# Patient Record
Sex: FEMALE | Race: ASIAN | NOT HISPANIC OR LATINO | Employment: FULL TIME | URBAN - METROPOLITAN AREA
[De-identification: names, ages, dates, MRNs, and addresses within clinical notes are randomized per-mention and may not be internally consistent; named-entity substitution may affect disease eponyms.]

---

## 2017-01-19 ENCOUNTER — APPOINTMENT (OUTPATIENT)
Dept: LAB | Facility: CLINIC | Age: 32
End: 2017-01-19

## 2017-01-19 ENCOUNTER — TRANSCRIBE ORDERS (OUTPATIENT)
Dept: LAB | Facility: CLINIC | Age: 32
End: 2017-01-19

## 2017-01-19 DIAGNOSIS — Z11.1 SCREENING EXAMINATION FOR PULMONARY TUBERCULOSIS: Primary | ICD-10-CM

## 2017-01-19 DIAGNOSIS — Z11.1 SCREENING EXAMINATION FOR PULMONARY TUBERCULOSIS: ICD-10-CM

## 2017-01-19 PROCEDURE — 86480 TB TEST CELL IMMUN MEASURE: CPT

## 2017-01-19 PROCEDURE — 36415 COLL VENOUS BLD VENIPUNCTURE: CPT

## 2017-01-21 LAB
ANNOTATION COMMENT IMP: NORMAL
GAMMA INTERFERON BACKGROUND BLD IA-ACNC: 0.08 IU/ML
M TB IFN-G BLD-IMP: NEGATIVE
M TB IFN-G CD4+ BCKGRND COR BLD-ACNC: 0.04 IU/ML
M TB IFN-G CD4+ T-CELLS BLD-ACNC: 0.12 IU/ML
MITOGEN IGNF BLD-ACNC: >10 IU/ML
QUANTIFERON-TB GOLD IN TUBE: NORMAL
SERVICE CMNT-IMP: NORMAL

## 2017-04-09 ENCOUNTER — OFFICE VISIT (OUTPATIENT)
Dept: URGENT CARE | Facility: MEDICAL CENTER | Age: 32
End: 2017-04-09
Payer: COMMERCIAL

## 2017-04-09 PROCEDURE — S9088 SERVICES PROVIDED IN URGENT: HCPCS

## 2017-04-09 PROCEDURE — 99213 OFFICE O/P EST LOW 20 MIN: CPT

## 2017-07-12 ENCOUNTER — OFFICE VISIT (OUTPATIENT)
Dept: URGENT CARE | Facility: MEDICAL CENTER | Age: 32
End: 2017-07-12
Payer: COMMERCIAL

## 2017-07-12 PROCEDURE — 99203 OFFICE O/P NEW LOW 30 MIN: CPT

## 2017-07-12 PROCEDURE — S9088 SERVICES PROVIDED IN URGENT: HCPCS

## 2017-07-20 ENCOUNTER — ALLSCRIPTS OFFICE VISIT (OUTPATIENT)
Dept: OTHER | Facility: OTHER | Age: 32
End: 2017-07-20

## 2017-07-20 DIAGNOSIS — N97.9 FEMALE INFERTILITY: ICD-10-CM

## 2017-07-20 DIAGNOSIS — N94.6 DYSMENORRHEA: ICD-10-CM

## 2017-07-20 DIAGNOSIS — R19.7 DIARRHEA: ICD-10-CM

## 2017-07-20 DIAGNOSIS — N80.9 ENDOMETRIOSIS: ICD-10-CM

## 2017-07-24 ENCOUNTER — TRANSCRIBE ORDERS (OUTPATIENT)
Dept: LAB | Facility: CLINIC | Age: 32
End: 2017-07-24

## 2017-07-24 ENCOUNTER — APPOINTMENT (OUTPATIENT)
Dept: LAB | Facility: CLINIC | Age: 32
End: 2017-07-24
Payer: COMMERCIAL

## 2017-07-24 ENCOUNTER — HOSPITAL ENCOUNTER (OUTPATIENT)
Dept: ULTRASOUND IMAGING | Facility: HOSPITAL | Age: 32
Discharge: HOME | End: 2017-07-24
Attending: OBSTETRICS & GYNECOLOGY
Payer: COMMERCIAL

## 2017-07-24 DIAGNOSIS — Z00.8 HEALTH EXAMINATION IN POPULATION SURVEY: Primary | ICD-10-CM

## 2017-07-24 DIAGNOSIS — N97.9 FEMALE INFERTILITY: ICD-10-CM

## 2017-07-24 DIAGNOSIS — N94.6 DYSMENORRHEA: ICD-10-CM

## 2017-07-24 DIAGNOSIS — Z00.8 HEALTH EXAMINATION IN POPULATION SURVEY: ICD-10-CM

## 2017-07-24 LAB
CHOLEST SERPL-MCNC: 154 MG/DL (ref 50–200)
EST. AVERAGE GLUCOSE BLD GHB EST-MCNC: 123 MG/DL
ESTRADIOL SERPL-MCNC: 50 PG/ML
FSH SERPL-ACNC: 5.3 MIU/ML
HBA1C MFR BLD: 5.9 % (ref 4.2–6.3)
HDLC SERPL-MCNC: 61 MG/DL (ref 40–60)
INSULIN SERPL-ACNC: 10.2 MU/L (ref 3–25)
LDLC SERPL CALC-MCNC: 83 MG/DL (ref 0–100)
LH SERPL-ACNC: 1.8 MIU/ML
TRIGL SERPL-MCNC: 51 MG/DL
TSH SERPL DL<=0.05 MIU/L-ACNC: 1.02 UIU/ML (ref 0.36–3.74)

## 2017-07-24 PROCEDURE — 76856 US EXAM PELVIC COMPLETE: CPT

## 2017-07-24 PROCEDURE — 83036 HEMOGLOBIN GLYCOSYLATED A1C: CPT

## 2017-07-24 PROCEDURE — 82670 ASSAY OF TOTAL ESTRADIOL: CPT

## 2017-07-24 PROCEDURE — 83525 ASSAY OF INSULIN: CPT

## 2017-07-24 PROCEDURE — 84403 ASSAY OF TOTAL TESTOSTERONE: CPT

## 2017-07-24 PROCEDURE — 84402 ASSAY OF FREE TESTOSTERONE: CPT

## 2017-07-24 PROCEDURE — 83001 ASSAY OF GONADOTROPIN (FSH): CPT

## 2017-07-24 PROCEDURE — 76830 TRANSVAGINAL US NON-OB: CPT

## 2017-07-24 PROCEDURE — 84443 ASSAY THYROID STIM HORMONE: CPT

## 2017-07-24 PROCEDURE — 83002 ASSAY OF GONADOTROPIN (LH): CPT

## 2017-07-24 PROCEDURE — 80061 LIPID PANEL: CPT

## 2017-07-24 PROCEDURE — 36415 COLL VENOUS BLD VENIPUNCTURE: CPT

## 2017-07-25 ENCOUNTER — ALLSCRIPTS OFFICE VISIT (OUTPATIENT)
Dept: OTHER | Facility: OTHER | Age: 32
End: 2017-07-25

## 2017-07-25 LAB
TESTOST FREE SERPL-MCNC: 6.1 PG/ML (ref 0–4.2)
TESTOST SERPL-MCNC: 68 NG/DL (ref 8–48)

## 2017-07-26 ENCOUNTER — GENERIC CONVERSION - ENCOUNTER (OUTPATIENT)
Dept: OTHER | Facility: OTHER | Age: 32
End: 2017-07-26

## 2017-08-16 ENCOUNTER — APPOINTMENT (OUTPATIENT)
Dept: LAB | Facility: CLINIC | Age: 32
End: 2017-08-16
Payer: COMMERCIAL

## 2017-08-16 ENCOUNTER — ALLSCRIPTS OFFICE VISIT (OUTPATIENT)
Dept: OTHER | Facility: OTHER | Age: 32
End: 2017-08-16

## 2017-08-16 DIAGNOSIS — N97.9 FEMALE INFERTILITY: ICD-10-CM

## 2017-08-16 DIAGNOSIS — N80.9 ENDOMETRIOSIS: ICD-10-CM

## 2017-08-16 DIAGNOSIS — N94.6 DYSMENORRHEA: ICD-10-CM

## 2017-08-16 LAB — PROGEST SERPL-MCNC: 1.2 NG/ML

## 2017-08-16 PROCEDURE — 84144 ASSAY OF PROGESTERONE: CPT

## 2017-08-16 PROCEDURE — 36415 COLL VENOUS BLD VENIPUNCTURE: CPT

## 2017-08-22 ENCOUNTER — GENERIC CONVERSION - ENCOUNTER (OUTPATIENT)
Dept: OTHER | Facility: OTHER | Age: 32
End: 2017-08-22

## 2017-08-23 ENCOUNTER — GENERIC CONVERSION - ENCOUNTER (OUTPATIENT)
Dept: OTHER | Facility: OTHER | Age: 32
End: 2017-08-23

## 2017-10-03 ENCOUNTER — ALLSCRIPTS OFFICE VISIT (OUTPATIENT)
Dept: OTHER | Facility: OTHER | Age: 32
End: 2017-10-03

## 2017-10-03 DIAGNOSIS — N80.9 ENDOMETRIOSIS: ICD-10-CM

## 2017-10-03 DIAGNOSIS — Z12.31 ENCOUNTER FOR SCREENING MAMMOGRAM FOR MALIGNANT NEOPLASM OF BREAST: ICD-10-CM

## 2017-10-03 DIAGNOSIS — R10.2 PELVIC AND PERINEAL PAIN: ICD-10-CM

## 2017-10-03 DIAGNOSIS — N97.9 FEMALE INFERTILITY: ICD-10-CM

## 2017-10-03 PROCEDURE — 87624 HPV HI-RISK TYP POOLED RSLT: CPT | Performed by: OBSTETRICS & GYNECOLOGY

## 2017-10-03 PROCEDURE — G0145 SCR C/V CYTO,THINLAYER,RESCR: HCPCS | Performed by: OBSTETRICS & GYNECOLOGY

## 2017-10-04 ENCOUNTER — LAB REQUISITION (OUTPATIENT)
Dept: LAB | Facility: HOSPITAL | Age: 32
End: 2017-10-04
Payer: COMMERCIAL

## 2017-10-04 DIAGNOSIS — Z01.419 ENCOUNTER FOR GYNECOLOGICAL EXAMINATION WITHOUT ABNORMAL FINDING: ICD-10-CM

## 2017-10-06 LAB — HPV RRNA GENITAL QL NAA+PROBE: NORMAL

## 2017-10-18 LAB
LAB AP GYN PRIMARY INTERPRETATION: NORMAL
Lab: NORMAL

## 2017-10-20 ENCOUNTER — GENERIC CONVERSION - ENCOUNTER (OUTPATIENT)
Dept: OTHER | Facility: OTHER | Age: 32
End: 2017-10-20

## 2017-10-24 ENCOUNTER — APPOINTMENT (OUTPATIENT)
Dept: LAB | Facility: CLINIC | Age: 32
End: 2017-10-24
Payer: COMMERCIAL

## 2017-10-24 ENCOUNTER — TRANSCRIBE ORDERS (OUTPATIENT)
Dept: LAB | Facility: CLINIC | Age: 32
End: 2017-10-24

## 2017-10-24 DIAGNOSIS — N97.9 FEMALE INFERTILITY: ICD-10-CM

## 2017-10-24 LAB — PROGEST SERPL-MCNC: 2.8 NG/ML

## 2017-10-24 PROCEDURE — 84144 ASSAY OF PROGESTERONE: CPT

## 2017-10-24 PROCEDURE — 36415 COLL VENOUS BLD VENIPUNCTURE: CPT

## 2017-10-25 ENCOUNTER — GENERIC CONVERSION - ENCOUNTER (OUTPATIENT)
Dept: OTHER | Facility: OTHER | Age: 32
End: 2017-10-25

## 2017-11-30 ENCOUNTER — ALLSCRIPTS OFFICE VISIT (OUTPATIENT)
Dept: OTHER | Facility: OTHER | Age: 32
End: 2017-11-30

## 2017-12-05 NOTE — PROGRESS NOTES
Assessment    1  Adjustment disorder with anxiety (309 24) (F43 22)   2  Need for immunization against influenza (V04 81) (Z23)    Discussion/Summary    Forms completed  Flu vaccine today  Possible side effects of new medications were reviewed with the patient/guardian today  The treatment plan was reviewed with the patient/guardian  The patient/guardian understands and agrees with the treatment plan      Chief Complaint  Patient is here to have forms filled out  History of Present Illness  33year old F here for having a form filled out for taking her NCLEX exam  She has anxiety and adjustment disorder and has extreme anxiety when taking exams  She states she gets flushed, nervous and can't concentrate  She is requesting a private room and an extra hour of testing time  She would like a flu vaccine  Review of Systems    Constitutional: No fever, no chills, feels well, no tiredness, no recent weight gain or weight loss  Psychiatric: as noted in HPI  Active Problems    1  Adjustment disorder with anxiety (309 24) (F43 22)   2  Candidiasis, cutaneous (112 3) (B37 2)   3  Chronic pelvic pain in female (629 2,037 70) (R10 2,G89 29)   4  Dysmenorrhea (625 3) (N94 6)   5  Encounter for screening mammogram for high-risk patient (V76 11) (Z12 31)   6  Endometriosis (617 9) (N80 9)   7  Female infertility (628 9) (N97 9)   8  Morbid obesity with BMI of 40 0-44 9, adult (278 01,V85 41) (E66 01,Z68 41)   9  Well female exam with routine gynecological exam (V72 31) (Z01 419)    Past Medical History    1  History of Closed Fracture Of The Left Clavicle (810 00)   2  History of pilonidal cyst (V13 3) (Z87 2)    The active problems and past medical history were reviewed and updated today  Surgical History    1  History of Laparoscopy (Diagnostic)   2  History of Pilonidal Cyst Resection    Family History  Mother    1  Family history of malignant neoplasm of breast (V16 3) (Z80 3)   2   Family history of malignant neoplasm of stomach (V16 0) (Z80 0)   3  Family history of Ovarian cancer  Father    4  Family history of migraine headaches (V17 2) (Z82 0)    The family history was reviewed and updated today  Social History    · Does not exercise (V69 0) (Z72 3)   · Denied: History of Drug use   · Never a smoker   · Social alcohol use (Z78 9)  The social history was reviewed and updated today  Current Meds   1  No Reported Medications Recorded    The medication list was reviewed and updated today  Allergies    1  Sulfa Drugs   2  Sulfamethoxazole POWD    3  No Known Environmental Allergies   4  No Known Food Allergies    Vitals  Vital Signs    Recorded: 11LEP6171 01:50PM   Temperature 98 5 F   Heart Rate 903   Systolic 050   Diastolic 82   Height 5 ft 2 in   Weight 220 lb    BMI Calculated 40 24   BSA Calculated 1 99   O2 Saturation 99     Physical Exam    Constitutional   General appearance: Abnormal   obese     Psychiatric   Orientation to person, place, and time: Normal     Mood and affect: Normal          Future Appointments    Date/Time Provider Specialty Site   49/17/1527 49:23 AM Brandyn Montemayor DO Obstetrics/Gynecology [address]     Signatures   Electronically signed by : Sara Jean MD; Nov 30 2017  2:14PM EST                       (Author)

## 2017-12-06 ENCOUNTER — ALLSCRIPTS OFFICE VISIT (OUTPATIENT)
Dept: OTHER | Facility: OTHER | Age: 32
End: 2017-12-06

## 2017-12-06 DIAGNOSIS — N97.9 FEMALE INFERTILITY: ICD-10-CM

## 2017-12-06 DIAGNOSIS — N80.9 ENDOMETRIOSIS: ICD-10-CM

## 2017-12-07 NOTE — PROGRESS NOTES
Assessment  1  Endometriosis (617 9) (N80 9)   2  Female infertility (628 9) (N97 9)   3  Anovulatory cycle (628 0) (N97 0)    Plan  Anovulatory cycle, Endometriosis    · (1) ESTRADIOL; Status:Active; Requested for:29Mkz5106;    Perform:[facility]; OXX:50IIP1430; Ordered;cycle, Endometriosis; Ordered By:Apryl Montemayor;   · * US PELVIS COMPLETE (TRANSABDOMINAL AND TRANSVAGINAL); Status:Hold For -Scheduling; Requested for:34Ear4967;    Perform:[address] [facility]; CLARISSE:10FDW1676; Ordered; For:Anovulatory cycle, Endometriosis; Ordered By:Apryl Montemayor;   · FL HYSTEROSALPINGOGRAM; Status:Active; Requested for:62Yuz2076;    Perform:[address] [facility]; BZR:26EOK6063; Last Updated Rose Earl; 12/6/2017 10:54:30 AM;Ordered; For:Anovulatory cycle, Endometriosis; Ordered By:Apryl Montemayor; Anovulatory cycle, Endometriosis, Female infertility    · (1) ANTI-MULLERIAN HORMONE; Status:Active; Requested for:96Fak3075;    Perform:[facility]; NQC:83OUM0872; Ordered; For:Anovulatory cycle, Endometriosis, Female infertility; Ordered By:Apryl Montemayor; Endometriosis, Female infertility    · Letrozole 2 5 MG Oral Tablet; take 2 tablet daily on cycle days 3-7   Rx By: Angele Heimlich; Dispense: 5 Days ; #:10 Tablet; Refill: 0;Endometriosis, Female infertility; SUPRIYA = N; Verified Transmission to [address] [place] [address] [address]; Last Updated By: System, [employer]; 12/6/2017 10:02:41 AM    Discussion/Summary  Discussion Summary: On cycle day 1, call to schedule day 3 ultrasound with Radiology and call office to set up hysterosalpingogram on cycles day 4-9 labs done on cycle day 3 if possible  letrozole on cycle day 3 and continue through day 7 will check for ovulation on cycle day 21 or 7 days after positive ovulation predictor kit by having a progesterone level drawn  semen analysis -test kit given     Counseling Documentation With Imm: The patient was counseled regarding diagnostic results,-- instructions for management,-- prognosis,-- patient and family education,-- risks and benefits of treatment options  total time of encounter was 30 minutes-- and-- 20 minutes was spent counseling  Chief Complaint  Chief Complaint Free Text Note Form: pt here for f/u on trying to conceive  states ovulation kits arent working, states blood work shows she isnt ovulating  would like to discuss other options  History of Present Illness  HPI: 33year old [de-identified] who presents for follow-up and information regarding ovulation induction with medication  Patient has been doing ovulation predictor kits at home and has not reached a peak  Her day 21 progesterone since have indicated that her cycles are anovulatory  Patient is very interested in pursuing pregnancy at this point  We discussed the risks and benefits of letrozole ovulation induction  I recommend HSG to confirm tubal patency secondary to history of endometriosis  Semen analysis should be performed and information for testing given to patient  We will check a day 3 ultrasound to look for ovarian cysts as well as an anti mullerian hormone level and an estradiol level to evaluate ovarian reserve and possible response to ovulation induction  All testing was reviewed with the patient in detail, all scripts given the and instruction sheet was reviewed and given to the patient  regards to the letrozole, we discussed the risk of birth defects, ovarian hyperstimulation, off-label use of the medication, possible side effects of hot flashes nausea headaches bloating muscle aches blurred vision and fatigue  We discussed that if in ovarian cyst is identified, we would not be able to proceed with letrozole cycle  ovulation does not occur after treatment with letrozole, I would refer to Reproductive Endocrine at that point  Patient verbalizes understanding  All questions answered        Review of Systems  Focused-Female:  Constitutional: not feeling poorly-- and-- not feeling tired  Gastrointestinal: no nausea-- and-- no vomiting  Genitourinary: pelvic pain-- and-- dysmenorrhea, but-- no vaginal discharge-- and-- no unexplained vaginal bleeding  ROS Reviewed:   ROS reviewed  Active Problems  1  Adjustment disorder with anxiety (309 24) (F43 22)   2  Endometriosis (617 9) (N80 9)   3  Female infertility (628 9) (N97 9)   4  Morbid obesity with BMI of 40 0-44 9, adult (278 01,V85 41) (Z79 96,T21 70)    Past Medical History  1  History of Closed Fracture Of The Left Clavicle (810 00)   2  History of pilonidal cyst (V13 3) (Z87 2)  Active Problems And Past Medical History Reviewed: The active problems and past medical history were reviewed and updated today  Surgical History    1  History of Laparoscopy (Diagnostic)   2  History of Pilonidal Cyst Resection  Surgical History Reviewed: The surgical history was reviewed and updated today  Family History  Mother    1  Family history of malignant neoplasm of breast (V16 3) (Z80 3)   2  Family history of malignant neoplasm of stomach (V16 0) (Z80 0)   3  Family history of Ovarian cancer  Father    4  Family history of migraine headaches (V17 2) (Z82 0)  Family History Reviewed: The family history was reviewed and updated today  Social History   · Does not exercise (V69 0) (Z72 3)   · Denied: History of Drug use   · Never a smoker   · Social alcohol use (Z78 9)  Social History Reviewed: The social history was reviewed and updated today  Current Meds   1  No Reported Medications Recorded  Medication List Reviewed: The medication list was reviewed and updated today  Allergies  1  Sulfa Drugs   2  Sulfamethoxazole POWD  3  No Known Environmental Allergies   4   No Known Food Allergies    Vitals  Vital Signs    Recorded: 49PHB3237 09:30AM   Heart Rate 96   Systolic 299   Diastolic 88   Height 5 ft 2 in   Weight 217 lb    BMI Calculated 39 69   BSA Calculated 1 98   Pain Scale 0 Physical Exam   Constitutional  General appearance: No acute distress, well appearing and well nourished     Psychiatric  Orientation to person, place, and time: Normal    Mood and affect: Normal        Signatures   Electronically signed by : Ashley Boland DO; Dec  6 5072 11:27AM EST                       (Author)

## 2017-12-28 ENCOUNTER — TRANSCRIBE ORDERS (OUTPATIENT)
Dept: MRI IMAGING | Facility: CLINIC | Age: 32
End: 2017-12-28

## 2017-12-29 ENCOUNTER — HOSPITAL ENCOUNTER (OUTPATIENT)
Dept: ULTRASOUND IMAGING | Facility: CLINIC | Age: 32
Discharge: HOME | End: 2017-12-29
Attending: OBSTETRICS & GYNECOLOGY
Payer: COMMERCIAL

## 2017-12-29 ENCOUNTER — APPOINTMENT (OUTPATIENT)
Dept: LAB | Facility: CLINIC | Age: 32
End: 2017-12-29
Payer: COMMERCIAL

## 2017-12-29 DIAGNOSIS — N80.9 ENDOMETRIOSIS: ICD-10-CM

## 2017-12-29 DIAGNOSIS — N97.9 FEMALE INFERTILITY: ICD-10-CM

## 2017-12-29 LAB — ESTRADIOL SERPL-MCNC: 43 PG/ML

## 2017-12-29 PROCEDURE — 82670 ASSAY OF TOTAL ESTRADIOL: CPT

## 2017-12-29 PROCEDURE — 76830 TRANSVAGINAL US NON-OB: CPT

## 2017-12-29 PROCEDURE — 83516 IMMUNOASSAY NONANTIBODY: CPT

## 2017-12-29 PROCEDURE — 76856 US EXAM PELVIC COMPLETE: CPT

## 2017-12-29 PROCEDURE — 36415 COLL VENOUS BLD VENIPUNCTURE: CPT

## 2018-01-02 ENCOUNTER — HOSPITAL ENCOUNTER (OUTPATIENT)
Dept: RADIOLOGY | Facility: HOSPITAL | Age: 33
Discharge: HOME | End: 2018-01-02
Attending: OBSTETRICS & GYNECOLOGY
Payer: COMMERCIAL

## 2018-01-02 DIAGNOSIS — N97.9 FEMALE INFERTILITY: ICD-10-CM

## 2018-01-02 DIAGNOSIS — N80.9 ENDOMETRIOSIS: ICD-10-CM

## 2018-01-02 PROCEDURE — 58340 CATHETER FOR HYSTEROGRAPHY: CPT

## 2018-01-02 PROCEDURE — 74740 X-RAY FEMALE GENITAL TRACT: CPT

## 2018-01-02 RX ADMIN — IOHEXOL 3 ML: 240 INJECTION, SOLUTION INTRATHECAL; INTRAVASCULAR; INTRAVENOUS; ORAL at 12:52

## 2018-01-03 ENCOUNTER — GENERIC CONVERSION - ENCOUNTER (OUTPATIENT)
Dept: OTHER | Facility: OTHER | Age: 33
End: 2018-01-03

## 2018-01-03 LAB — MIS SERPL-MCNC: 1.4 NG/ML

## 2018-01-11 NOTE — MISCELLANEOUS
Message   Recorded as Task   Date: 07/26/2017 03:13 PM, Created By: Hamlet Ramey   Task Name: Call Back   Assigned To: Yoseph Hayes   Regarding Patient: Jacque Or, Status: Active   Comment:    Diamond Mccarthy - 26 Jul 2017 3:13 PM     TASK CREATED  Caller: Self; Results Inquiry; [phone] (Home); [phone] (Work)  patient returned you call in regards to bloodwork and us results   Yoseph Hayes - 26 Jul 2017 4:30 PM     TASK EDITED  spoke with patient  see labs/US  levels look good, elevated T    small intramural fibroid  does not appear to affect lining  OPK test  call with positive for progesterone level 7 days later          Signatures   Electronically signed by : Emre Hickman DO; Jul 26 1978  4:30PM EST                       (Author)

## 2018-01-11 NOTE — RESULT NOTES
Verified Results  (1) PROGESTERONE [id number] 90:94XB Poala Delgadillo Order Number: [id number]     Test Name Result Flag Reference   PROGESTERONE 1 20 ng/mL     Patients taking DHEA-S may have falsely elevated Progesterone levels  Recommend ordering Progesterone, Lab Ena 585405 to be done by Rickie  PROGESTERONE:     Serum progesterone 5-25 ng/mL should not be the sole determinant in excluding pregnancy  Menstruating Females:    Follicular phase 4 114-8 49 ng/mL   Luteal phase 2 25-24 2 ng/mL   Mid-luteal phase 8 76-21 6 ng/mL   Postmenopausal Females <0 200-0 901 ng/mL     Pregnant Females:   First trimester of pregnancy 11 4-41 0 ng/mL   Second trimester of pregnancy 13 8-156 ng/mL   Third trimester of pregnancy 51 4->200 ng/mL

## 2018-01-11 NOTE — MISCELLANEOUS
Message   Recorded as Task   Date: 08/23/2017 12:42 PM, Created By: Zacarias Her   Task Name: Medical Complaint Callback   Assigned To: Eileen Holm   Regarding Patient: Marie Meyer, Status: Active   CommentPerlaalma Portillo - 23 Aug 2017 12:42 PM     TASK CREATED  Caller: Self; Medical Complaint; [phone] (Home); [phone] (Work)  Got your message about her blood results but wnats to know if she can try it again, next cycle or the one after that  She feels her hormones were "all messed up" due to a lot of stress she was having in her life  Please call to discuss options and if she can repeat the process in another cycle   Eileen Holm - 23 Aug 2017 4:55 PM     TASK EDITED  spoke to patient  will repeat progesterone 7 days after + OPK  order placed          Signatures   Electronically signed by : Eneida Foy DO; Aug 23 2899  4:55PM EST                       (Author)

## 2018-01-12 VITALS
SYSTOLIC BLOOD PRESSURE: 124 MMHG | DIASTOLIC BLOOD PRESSURE: 84 MMHG | HEART RATE: 78 BPM | BODY MASS INDEX: 40.67 KG/M2 | OXYGEN SATURATION: 98 % | HEIGHT: 62 IN | TEMPERATURE: 98.6 F | WEIGHT: 221 LBS

## 2018-01-12 VITALS
HEART RATE: 87 BPM | BODY MASS INDEX: 40 KG/M2 | SYSTOLIC BLOOD PRESSURE: 130 MMHG | HEIGHT: 62 IN | DIASTOLIC BLOOD PRESSURE: 84 MMHG | WEIGHT: 217.38 LBS | OXYGEN SATURATION: 95 % | TEMPERATURE: 98.3 F

## 2018-01-13 VITALS
WEIGHT: 219 LBS | SYSTOLIC BLOOD PRESSURE: 134 MMHG | HEART RATE: 85 BPM | HEIGHT: 62 IN | BODY MASS INDEX: 40.3 KG/M2 | DIASTOLIC BLOOD PRESSURE: 88 MMHG

## 2018-01-14 VITALS
HEART RATE: 100 BPM | DIASTOLIC BLOOD PRESSURE: 82 MMHG | TEMPERATURE: 98.5 F | WEIGHT: 220 LBS | HEIGHT: 62 IN | BODY MASS INDEX: 40.48 KG/M2 | OXYGEN SATURATION: 99 % | SYSTOLIC BLOOD PRESSURE: 112 MMHG

## 2018-01-14 NOTE — RESULT NOTES
Verified Results  (1) PROGESTERONE [id number] 28:92FQ Berna Gil Order Number: [id number]     Test Name Result Flag Reference   PROGESTERONE 2 80 ng/mL     Patients taking DHEA-S may have falsely elevated Progesterone levels  Recommend ordering Progesterone, Lab Ena 062507 to be done by Rickie  PROGESTERONE:     Serum progesterone 5-25 ng/mL should not be the sole determinant in excluding pregnancy  Menstruating Females:    Follicular phase 8 219-1 66 ng/mL   Luteal phase 2 25-24 2 ng/mL   Mid-luteal phase 8 76-21 6 ng/mL   Postmenopausal Females <0 200-0 901 ng/mL     Pregnant Females:   First trimester of pregnancy 11 4-41 0 ng/mL   Second trimester of pregnancy 13 8-156 ng/mL   Third trimester of pregnancy 51 4->200 ng/mL

## 2018-01-15 NOTE — MISCELLANEOUS
Dear Lorenza Huffman,  I am happy to report that your Pap test collected during your recent exam  was normal  The HPV test was negative  Based on the most recent guidelines, you will be due for your next Pap test and HPV testing in 3 years  However, I will continue to see you annually for your [facility] visit  If you have any questions, please  do not hesitate to contact my office  Sincerely,    Apryl RAINES   [address] [place] [address], DO

## 2018-01-15 NOTE — RESULT NOTES
Verified Results  (1) THIN PREP PAP WITH IMAGING [id number] 07:04DK Samuel Greer     Test Name Result Flag Reference   LAB AP CASE REPORT (Report)     Gynecologic Cytology Report            Case: [id number]                  Authorizing Provider: Jazmyne Dale DO     Collected:      10/03/2017           First Screen:     JAYDEN Appiah    Received:      10/05/2017 1052        Specimen:  LIQUID-BASED PAP, SCREENING, Endocervical   HPV HIGH RISK RESULT (Report)     HPV, High Risk: HPV NEG, HPV16 NEG, HPV18 NEG      Other High Risk HPV Negative, HPV 16 Negative, HPV 18 Negative  HPV types: 16,18,31,33,35,39,45,51,52,56,58,59,66 and 68 DNA are undetectable or below the pre-set threshold  Nekst FDA approved John 4800 is utilized with strict adherence to the manufacturers instruction  manual to test for the presence of High-Risk HPV DNA, as well as HPV 16 and HPV 18  This instrument  has been validated by our laboratory and/or by the   A negative result does not preclude the presence of HPV infection because results depend on adequate  specimen collection, absence of inhibitors and sufficient DNA to be detected  Additionally, HPV negative  results are not intended to prevent women from proceeding to colposcopy if clinically warranted  Positive HPV test results indicate the presence of any one or more of the high risk types, but since patients  are often co-infected with low-risk types it does not rule out the presence of low-risk types in patients  with mixed infections  LAB AP GYN PRIMARY INTERPRETATION      Negative for intraepithelial lesion or malignancy  Electronically signed by JAYDEN Appiah on 10/18/2017 at 2:38 PM   LAB AP GYN SPECIMEN ADEQUACY      Satisfactory for evaluation  Endocervical/transformation zone component present     LAB AP GYN ADDITIONAL INFORMATION (Report)     [employer]'s FDA approved ,  and ThinPrep Imaging System are   utilized with strict adherence to the 's instruction manual to   prepare gynecologic and non-gynecologic cytology specimens for the   production of ThinPrep slides as well as for gynecologic ThinPrep imaging  These processes have been validated by our laboratory and/or by the     The Pap test is not a diagnostic procedure and should not be used as the   sole means to detect cervical cancer  It is only a screening procedure to   aid in the detection of cervical cancer and its precursors  Both   false-negative and false-positive results have been experienced  Your   patient's test result should be interpreted in this context together with   the history and clinical findings

## 2018-01-16 NOTE — RESULT NOTES
Verified Results  * US PELVIS COMPLETE [facility] OF [place] AND TRANSVAGINAL) [id number] 03:37ME [facility] Order Number: [id number]    - Patient Instructions: To schedule this appointment, please contact Central Scheduling at [phone]  Test Name Result Flag Reference   US PELVIS COMPLETE (TRANSABDOMINAL AND TRANSVAGINAL) (Report)     PELVIC ULTRASOUND, COMPLETE     INDICATION: Female infertility  Patient has history of endometriosis  COMPARISON: Hysterosalpingogram from 10/20/2015, abdomen and pelvic CT from 9/26/2015, and pelvic ultrasound from 2/3/2015  TECHNIQUE:  Transabdominal pelvic ultrasound was performed in sagittal and transverse planes with a curvilinear transducer  Additional transvaginal imaging was performed to better evaluate the endometrium and ovaries  Imaging included volumetric    sweeps as well as traditional still imaging technique  FINDINGS:     UTERUS:   The uterus is anteverted in position, measuring 8 0 x 4 0 x 4 9 cm  Contour and echotexture appear normal  There is a 1 3 x 1 8 x 1 3 cm anterior fundal intramural fibroid  The cervix shows no suspicious abnormality  ENDOMETRIUM:    Normal caliber of 9 mm  Homogenous and normal in appearance  OVARIES/ADNEXA:   Right ovary: 3 3 x 1 8 x 2 3 cm  No suspicious right ovarian abnormality  Doppler flow within normal limits  Left ovary: 2 5 x 1 6 x 1 4 cm  No suspicious left ovarian abnormality  Doppler flow within normal limits  No suspicious adnexal mass or loculated collections  Specifically, there is no hydrosalpinx  There is no free fluid  IMPRESSION:      There is a 1 3 x 1 8 x 1 3 cm anterior fundal intramural fibroid  Otherwise unremarkable pelvic ultrasound            Workstation performed: [id number]     Signed by:   Jacinta Wellington MD   7/24/17     (1) [address] [place], [place] [id number] 61:55VG [facility] Order Number: [id number]     Test Name Result Flag Reference   FOLLICLE STIMULATING HORMONE 5 3 mIU/mL     FSH:  Menstruating Females: Follicular Phase  2 3-07 9 mIU/mL    Mid-Cycle Phase   5 2-17 5 mIU/mL    Luteal Phase      1 7-9 5  mIU/mL  Postmenopausal Females    On Menopausal Hormone Therapy(HRT) 5 9-72 8   mIU/mL    Untreated                          12 7-132 2 mIU/mL     (1) LH (LEUTINIZING HORMONE) [id number] 42:27JS Jennifer Dunen Order Number: [id number]     Test Name Result Flag Reference   LUTEINIZING HORMONE 1 8 mIU/mL     LH:   Menstruating Females:   ??? Follicular Phase ? ??1 9-12 8 ? ? ? mIU/mL   ? ?? Mid-Cycle Phase ? ?? 22 8-76 1 mIU/mL   ? ?? Luteal Phase ??? ??? ???0 6-13 5 ? ? ? mIU/mL   Postmenopausal Females:   ??? On Menopausal Hormone Therapy(MHT) 1 1-52 4 mIU/mL   ? ?? Untreated ??? ??? ??? ??? ??? ??? ??? ??? ??? ??? ??? ??? ???8 6-61 8 mIU/mL     (1) ESTRADIOL [id number] 41:53PE Jennifer Dunne Order Number: [id number]     Test Name Result Flag Reference   ESTRADIOL 50 0 pg/mL     ESTRADIOL:    Mentruating Females: Follicular phase:  87 6-596 5  pg/mL      Mid-cycle phase:   49 9- 367 2 pg/mL      Luteal phase:      40 2-259    pg/mL     Postmenopausal females (untreated):  <11-58 3  pg/mL  On Menopausal Hormone Therapy (MHT): < 1 pg/mL    Women taking the drug Fulvestrant (Faslodex) may have falsely elevated Estradiol results  Suggest ordering [facility] Estradiol, LC/MS -test number [id number], to monitor these patients       (1) INSULIN, FASTING [id number] 95:06XN Jennifer Dunne Order Number: [id number]     Test Name Result Flag Reference   INSULIN, FASTING 10 2 mU/L  3 0-25 0     (1) TSH WITH FT4 REFLEX [id number] 61:DINORA Dunne Order Number: [id number]     Test Name Result Flag Reference   TSH 1 023 uIU/mL  0 358-3 740   Patients undergoing fluorescein dye angiography may retain small amounts of fluorescein in the body for 48-72 hours post procedure  Samples containing fluorescein can produce falsely depressed TSH values  If the patient had this procedure,a specimen should be resubmitted post fluorescein clearance  The recommended reference ranges for TSH during pregnancy are as follows:  First trimester 0 1 to 2 5 uIU/mL  Second trimester  0 2 to 3 0 uIU/mL  Third trimester 0 3 to 3 0 uIU/m     (1) TESTOSTERONE, FREE (DIRECT) AND TOTAL [id number] 58:85ZL Jennifer Dunne Order Number: [id number]     Test Name Result Flag Reference   FREE TESTOSTERONE, DIRECT 6 1 pg/mL H 0 0 - 4 2   TESTOSTERONE (TOTAL) 68 ng/dL H 8 - 48   Performed at:  [address] [place] [address]  [id number]  : Debbi Marinelli MD, Phone:  [phone]     (1) LIPID PANEL, FASTING [id number] 58:73YV [address] [facility]     Test Name Result Flag Reference   CHOLESTEROL 154 mg/dL     HDL,DIRECT 61 mg/dL H 40-60   Specimen collection should occur prior to Metamizole administration due to the potential for falsely depressed results  LDL CHOLESTEROL CALCULATED 83 mg/dL  0-100   This is a fasting blood test  Water,black tea or black  coffee only after 9:00pm the night before test  Drink 2 glasses of water the morning of test         Triglyceride:         Normal              <150 mg/dl       Borderline High    150-199 mg/dl       High               200-499 mg/dl       Very High          >499 mg/dl  Cholesterol:         Desirable        <200 mg/dl      Borderline High  200-239 mg/dl      High             >239 mg/dl  HDL Cholesterol:        High    >59 mg/dL      Low     <41 mg/dL  LDL CALCULATED:    This screening LDL is a calculated result  It does not have the accuracy of the Direct Measured LDL in the monitoring of patients with hyperlipidemia and/or statin therapy  Direct Measure LDL (NGB036) must be ordered separately in these patients     TRIGLYCERIDES 51 mg/dL  <=150   Specimen collection should occur prior to N-Acetylcysteine or Metamizole administration due to the potential for falsely depressed results  (1) HEMOGLOBIN A1C [id number] 97:69WX Serenity Montemayor     Test Name Result Flag Reference   HEMOGLOBIN A1C 5 9 %  4 2-6 3   EST  AVG   GLUCOSE 123 mg/dl

## 2018-01-18 ENCOUNTER — HOSPITAL ENCOUNTER (EMERGENCY)
Facility: HOSPITAL | Age: 33
Discharge: HOME | End: 2018-01-18
Attending: EMERGENCY MEDICINE | Admitting: EMERGENCY MEDICINE
Payer: COMMERCIAL

## 2018-01-18 ENCOUNTER — APPOINTMENT (EMERGENCY)
Dept: ULTRASOUND IMAGING | Facility: HOSPITAL | Age: 33
End: 2018-01-18
Payer: COMMERCIAL

## 2018-01-18 VITALS
TEMPERATURE: 98.5 F | HEART RATE: 89 BPM | OXYGEN SATURATION: 100 % | SYSTOLIC BLOOD PRESSURE: 140 MMHG | BODY MASS INDEX: 40.42 KG/M2 | WEIGHT: 221 LBS | DIASTOLIC BLOOD PRESSURE: 80 MMHG | RESPIRATION RATE: 18 BRPM

## 2018-01-18 DIAGNOSIS — R10.2 PELVIC PAIN: Primary | ICD-10-CM

## 2018-01-18 LAB
ANION GAP SERPL CALCULATED.3IONS-SCNC: 10 MMOL/L (ref 4–13)
BASOPHILS # BLD AUTO: 0.02 THOUSANDS/ΜL (ref 0–0.1)
BASOPHILS NFR BLD AUTO: 0 % (ref 0–1)
BUN SERPL-MCNC: 16 MG/DL (ref 5–25)
CALCIUM SERPL-MCNC: 9.3 MG/DL (ref 8.3–10.1)
CHLORIDE SERPL-SCNC: 103 MMOL/L (ref 100–108)
CLARITY, POC: CLEAR
CO2 SERPL-SCNC: 25 MMOL/L (ref 21–32)
COLOR, POC: YELLOW
CREAT SERPL-MCNC: 0.81 MG/DL (ref 0.6–1.3)
EOSINOPHIL # BLD AUTO: 0.14 THOUSAND/ΜL (ref 0–0.61)
EOSINOPHIL NFR BLD AUTO: 2 % (ref 0–6)
ERYTHROCYTE [DISTWIDTH] IN BLOOD BY AUTOMATED COUNT: 14.2 % (ref 11.6–15.1)
EXT BILIRUBIN, UA: NEGATIVE
EXT BLOOD URINE: NEGATIVE
EXT GLUCOSE, UA: NEGATIVE
EXT KETONES: NEGATIVE
EXT NITRITE, UA: NEGATIVE
EXT PH, UA: 7.5
EXT PREG TEST URINE: NEGATIVE
EXT PROTEIN, UA: NEGATIVE
EXT SPECIFIC GRAVITY, UA: 1
EXT UROBILINOGEN: NEGATIVE
GFR SERPL CREATININE-BSD FRML MDRD: 97 ML/MIN/1.73SQ M
GLUCOSE SERPL-MCNC: 83 MG/DL (ref 65–140)
HCT VFR BLD AUTO: 41.9 % (ref 34.8–46.1)
HGB BLD-MCNC: 13.1 G/DL (ref 11.5–15.4)
LYMPHOCYTES # BLD AUTO: 3.02 THOUSANDS/ΜL (ref 0.6–4.47)
LYMPHOCYTES NFR BLD AUTO: 35 % (ref 14–44)
MCH RBC QN AUTO: 26.2 PG (ref 26.8–34.3)
MCHC RBC AUTO-ENTMCNC: 31.3 G/DL (ref 31.4–37.4)
MCV RBC AUTO: 84 FL (ref 82–98)
MONOCYTES # BLD AUTO: 0.64 THOUSAND/ΜL (ref 0.17–1.22)
MONOCYTES NFR BLD AUTO: 7 % (ref 4–12)
NEUTROPHILS # BLD AUTO: 4.89 THOUSANDS/ΜL (ref 1.85–7.62)
NEUTS SEG NFR BLD AUTO: 56 % (ref 43–75)
PLATELET # BLD AUTO: 285 THOUSANDS/UL (ref 149–390)
PMV BLD AUTO: 10.6 FL (ref 8.9–12.7)
POTASSIUM SERPL-SCNC: 3.8 MMOL/L (ref 3.5–5.3)
RBC # BLD AUTO: 5 MILLION/UL (ref 3.81–5.12)
SODIUM SERPL-SCNC: 138 MMOL/L (ref 136–145)
WBC # BLD AUTO: 8.71 THOUSAND/UL (ref 4.31–10.16)
WBC # BLD EST: NEGATIVE 10*3/UL

## 2018-01-18 PROCEDURE — 99284 EMERGENCY DEPT VISIT MOD MDM: CPT

## 2018-01-18 PROCEDURE — 76856 US EXAM PELVIC COMPLETE: CPT

## 2018-01-18 PROCEDURE — 81002 URINALYSIS NONAUTO W/O SCOPE: CPT | Performed by: PHYSICIAN ASSISTANT

## 2018-01-18 PROCEDURE — 76830 TRANSVAGINAL US NON-OB: CPT

## 2018-01-18 PROCEDURE — 85025 COMPLETE CBC W/AUTO DIFF WBC: CPT | Performed by: PHYSICIAN ASSISTANT

## 2018-01-18 PROCEDURE — 36415 COLL VENOUS BLD VENIPUNCTURE: CPT | Performed by: PHYSICIAN ASSISTANT

## 2018-01-18 PROCEDURE — 80048 BASIC METABOLIC PNL TOTAL CA: CPT | Performed by: PHYSICIAN ASSISTANT

## 2018-01-18 PROCEDURE — 81025 URINE PREGNANCY TEST: CPT | Performed by: PHYSICIAN ASSISTANT

## 2018-01-18 PROCEDURE — 96374 THER/PROPH/DIAG INJ IV PUSH: CPT

## 2018-01-18 RX ORDER — NAPROXEN 500 MG/1
500 TABLET ORAL 2 TIMES DAILY WITH MEALS
Qty: 14 TABLET | Refills: 0 | Status: SHIPPED | OUTPATIENT
Start: 2018-01-18 | End: 2018-09-25 | Stop reason: ALTCHOICE

## 2018-01-18 RX ORDER — KETOROLAC TROMETHAMINE 30 MG/ML
15 INJECTION, SOLUTION INTRAMUSCULAR; INTRAVENOUS ONCE
Status: COMPLETED | OUTPATIENT
Start: 2018-01-18 | End: 2018-01-18

## 2018-01-18 RX ADMIN — KETOROLAC TROMETHAMINE 15 MG: 30 INJECTION, SOLUTION INTRAMUSCULAR at 19:14

## 2018-01-18 NOTE — ED NOTES
Pt transported to [address] [place], [place] [address]       Aggie Stone, ANAND  01/18/18 8992

## 2018-01-18 NOTE — ED PROVIDER NOTES
History  Chief Complaint   Patient presents with    Abdominal Pain     c/o LLQ pain and nausea x 2 days  Pt had 1st course of Letracal on 12/29/17  History provided by:  Patient  Abdominal Pain   Pain location:  LLQ  Pain quality: sharp and throbbing    Pain radiates to:  Does not radiate  Pain severity:  Moderate  Onset quality:  Gradual  Duration:  2 days  Timing:  Constant  Progression:  Waxing and waning  Chronicity:  New  Context: not alcohol use, not awakening from sleep, not diet changes, not eating, not laxative use, not medication withdrawal, not previous surgeries, not recent illness, not recent sexual activity, not recent travel, not retching, not sick contacts, not suspicious food intake and not trauma    Relieved by:  None tried  Worsened by: Movement and palpation  Ineffective treatments:  None tried  Associated symptoms: anorexia and nausea    Associated symptoms: no belching, no chest pain, no chills, no constipation, no cough, no diarrhea, no dysuria, no fatigue, no fever, no flatus, no hematemesis, no hematochezia, no hematuria, no melena, no shortness of breath, no sore throat, no vaginal bleeding, no vaginal discharge and no vomiting    Risk factors: obesity    Risk factors: no alcohol abuse, no aspirin use, not elderly, has not had multiple surgeries, no NSAID use, not pregnant and no recent hospitalization        None       Past Medical History:   Diagnosis Date    Endometriosis        Past Surgical History:   Procedure Laterality Date    ABDOMINAL SURGERY      LAPAROSCOPIC ENDOMETRIOSIS FULGURATION         History reviewed  No pertinent family history  I have reviewed and agree with the history as documented  Social History   Substance Use Topics    Smoking status: Never Smoker    Smokeless tobacco: Never Used    Alcohol use Yes        Review of Systems   Constitutional: Positive for activity change and appetite change  Negative for chills, diaphoresis, fatigue and fever  HENT: Negative for congestion, dental problem, ear discharge, ear pain, mouth sores, postnasal drip, rhinorrhea and sore throat  Eyes: Negative for pain, discharge, redness and itching  Respiratory: Negative for cough, chest tightness and shortness of breath  Cardiovascular: Negative for chest pain, palpitations and leg swelling  Gastrointestinal: Positive for abdominal pain, anorexia and nausea  Negative for anal bleeding, blood in stool, constipation, diarrhea, flatus, hematemesis, hematochezia, melena and vomiting  Endocrine: Negative for cold intolerance, heat intolerance, polydipsia and polyphagia  Genitourinary: Negative for dysuria, hematuria, vaginal bleeding and vaginal discharge  Musculoskeletal: Negative for back pain and myalgias  Skin: Negative for color change, pallor, rash and wound  Hematological: Negative for adenopathy  Does not bruise/bleed easily  All other systems reviewed and are negative  Physical Exam  ED Triage Vitals [01/18/18 1655]   Temperature Pulse Respirations Blood Pressure SpO2   98 5 °F (36 9 °C) 94 17 (!) 174/90 100 %      Temp Source Heart Rate Source Patient Position - Orthostatic VS BP Location FiO2 (%)   Oral Monitor Sitting Left arm --      Pain Score       8           Orthostatic Vital Signs  Vitals:    01/18/18 1655 01/18/18 1938 01/18/18 2021   BP: (!) 174/90 129/72 140/80   Pulse: 94 78 89   Patient Position - Orthostatic VS: Sitting Lying Sitting       Physical Exam   Constitutional: She is oriented to person, place, and time  She appears well-developed  No distress  HENT:   Head: Normocephalic  Right Ear: External ear normal    Left Ear: External ear normal    Nose: Nose normal    Mouth/Throat: Oropharynx is clear and moist    Eyes: Conjunctivae are normal  Right eye exhibits no discharge  Left eye exhibits no discharge  Neck: No tracheal deviation present  Cardiovascular: Normal rate, regular rhythm and normal heart sounds    Exam reveals no gallop and no friction rub  No murmur heard  Pulmonary/Chest: Effort normal  No respiratory distress  She has no wheezes  She has no rales  Abdominal: Soft  She exhibits no mass  There is tenderness  There is guarding  There is no rebound  No hernia  Musculoskeletal: She exhibits no edema or tenderness  Lymphadenopathy:     She has no cervical adenopathy  Neurological: She is alert and oriented to person, place, and time  Skin: Skin is warm  Capillary refill takes less than 2 seconds  She is not diaphoretic  No erythema  No pallor  Psychiatric: She has a normal mood and affect  Her behavior is normal  Judgment and thought content normal    Nursing note and vitals reviewed  ED Medications  Medications   ketorolac (TORADOL) injection 15 mg (15 mg Intravenous Given 1/18/18 1914)       Diagnostic Studies  Results Reviewed     Procedure Component Value Units Date/Time    Basic metabolic panel [[id number]] Collected:  01/18/18 1811    Lab Status:  Final result Specimen:  Blood Updated:  01/18/18 1916     Sodium 138 mmol/L      Potassium 3 8 mmol/L      Chloride 103 mmol/L      CO2 25 mmol/L      Anion Gap 10 mmol/L      BUN 16 mg/dL      Creatinine 0 81 mg/dL      Glucose 83 mg/dL      Calcium 9 3 mg/dL      eGFR 97 ml/min/1 73sq m     Narrative:         National Kidney Disease Education Program recommendations are as follows:  GFR calculation is accurate only with a steady state creatinine  Chronic Kidney disease less than 60 ml/min/1 73 sq  meters  Kidney failure less than 15 ml/min/1 73 sq  meters      POCT pregnancy, urine [[id number]]  (Normal) Resulted:  01/18/18 1836    Lab Status:  Final result Updated:  01/18/18 1836     EXT PREG TEST UR (Ref: Negative) negative    POCT urinalysis dipstick [[id number]]  (Normal) Resulted:  01/18/18 1834    Lab Status:  Final result Specimen:  Urine Updated:  01/18/18 1836     Color, UA yellow     Clarity, UA clear     EXT Glucose, UA negative     EXT Bilirubin, UA (Ref: Negative) negative     EXT Ketones, UA (Ref: Negative) negative     EXT Spec Grav, UA 1 005     EXT Blood, UA (Ref: Negative) negative     EXT pH, UA 7 5     EXT Protein, UA (Ref: Negative) negative     EXT Urobilinogen, UA (Ref: 0 2- 1 0) negative     EXT Leukocytes, UA (Ref: Negative) negative     EXT Nitrite, UA (Ref: Negative) negative    CBC and differential [[id number]]  (Abnormal) Collected:  01/18/18 1811    Lab Status:  Final result Specimen:  Blood Updated:  01/18/18 1820     WBC 8 71 Thousand/uL      RBC 5 00 Million/uL      Hemoglobin 13 1 g/dL      Hematocrit 41 9 %      MCV 84 fL      MCH 26 2 (L) pg      MCHC 31 3 (L) g/dL      RDW 14 2 %      MPV 10 6 fL      Platelets 257 Thousands/uL      Neutrophils Relative 56 %      Lymphocytes Relative 35 %      Monocytes Relative 7 %      Eosinophils Relative 2 %      Basophils Relative 0 %      Neutrophils Absolute 4 89 Thousands/µL      Lymphocytes Absolute 3 02 Thousands/µL      Monocytes Absolute 0 64 Thousand/µL      Eosinophils Absolute 0 14 Thousand/µL      Basophils Absolute 0 02 Thousands/µL                  US pelvis complete w transvaginal   ED Interpretation by Mindy Esteban PA-C (01/18 1945)   Stable Eau myeloma in the fundus measuring 3 cm  Complex nabothian cyst   No evidence of pathological cyst, adnexal mass or ovarian torsion  Final Result by Betsy Moon MD (01/18 1900)          1  Stable leiomyoma in the fundus measuring 3 cm  2  Complex nabothian cyst    3  No evidence of pathologic cyst, adnexal mass or ovarian torsion               Workstation performed: [id number]                    Procedures  Procedures       Phone Contacts  ED Phone Contact    ED Course  ED Course                                MDM  Number of Diagnoses or Management Options  Pelvic pain: new and requires workup     Amount and/or Complexity of Data Reviewed  Clinical lab tests: ordered and reviewed  Tests in the radiology section of CPT®: ordered and reviewed  Tests in the medicine section of CPT®: ordered and reviewed    Risk of Complications, Morbidity, and/or Mortality  Presenting problems: high  Diagnostic procedures: high  Management options: high  General comments: Patient was seen and evaluated for left lower quadrant abdominal pain  She has a history of endometriosis  She has made multiple calls to her OBGYN physician today was instructed to come to the emergency room for evaluation  She was seen and evaluated  She has been tenderness and guarding in her left adnexa  Laboratory studies were sent are reviewed  She had an ultrasound that was transvaginal that demonstrated a fibroid but no acute adnexal cyst, ovarian torsion  Patient was discharge instructed to follow up with her gyn physician in the next 1-2 days  She was given a prescription for Naprosyn to take twice daily as needed for pain with food  Patient Progress  Patient progress: stable    CritCare Time    Disposition  Final diagnoses:   Pelvic pain - Endometriosis     Time reflects when diagnosis was documented in both MDM as applicable and the Disposition within this note     Time User Action Codes Description Comment    1/18/2018  7:46 PM Gloryanthony Nielsen Add [R10 2] Pelvic pain     1/18/2018  7:46 PM Glory Fend Modify [R10 2] Pelvic pain Endometriosis      ED Disposition     ED Disposition Condition Comment    Discharge  [facility] discharge to home/self care      Condition at discharge: Good        Follow-up Information     Follow up With Specialties Details Why [address] [facility],  Obstetrics and Gynecology Schedule an appointment as soon as possible for a visit in 2 days  Dick Tuttle  [facility] [address]  [phone]          Discharge Medication List as of 1/18/2018  7:47 PM      START taking these medications    Details   naproxen (NAPROSYN) 500 mg tablet Take 1 tablet by mouth 2 (two) times a day with meals, Starting Thu 1/18/2018, Print           No discharge procedures on file      ED Provider  Electronically Signed by           David Lagunas PA-C  01/18/18 2319

## 2018-01-19 ENCOUNTER — GENERIC CONVERSION - ENCOUNTER (OUTPATIENT)
Dept: OTHER | Facility: OTHER | Age: 33
End: 2018-01-19

## 2018-01-19 NOTE — DISCHARGE INSTRUCTIONS
Pelvic Pain in Women   WHAT YOU NEED TO KNOW:   You may have pain on one or both sides of your pelvis  Pelvic pain may occur with certain body positions or activities, such as when you have sex or a bowel movement  It may worsen during your monthly period or after you sit or stand for a long time  Chronic pelvic pain is pain that continues for longer than 6 months  DISCHARGE INSTRUCTIONS:   Call 911 for any of the following:   · You have severe chest pain and sudden trouble breathing  Seek care immediately if:   · You have heavy or unusual vaginal bleeding, and you feel lightheaded or faint  Contact your healthcare provider if:   · You have pelvic pain that does not go away after you take pain medicine  · You develop new symptoms or your symptoms are worse than before  · You have questions or concerns about your condition or care  Medicines: You may need any of the following:  · Pain medicine  may be given in pills or creams to relieve your pain  · Hormones  may be given if your pain gets worse with your menstrual cycle  · Antibiotics  may be given if your pain is caused by infection  · Take your medicine as directed  Contact your healthcare provider if you think your medicine is not helping or if you have side effects  Tell him or her if you are allergic to any medicine  Keep a list of the medicines, vitamins, and herbs you take  Include the amounts, and when and why you take them  Bring the list or the pill bottles to follow-up visits  Carry your medicine list with you in case of an emergency  Self-care:   · Keep a pain diary  Write down when your pain happens, how severe it is, and any other symptoms you have with your pain  A diary will help you keep track of pain cycles  It may also help your healthcare provider find out what is causing your pain  · Learn ways to relax  Deep breathing, meditation, and relaxation techniques can help decrease your pain   When you are tense, your pain may increase  · Change the foods you eat if you have irritable bowel syndrome  Ask your healthcare provider about the best foods for you  Follow up with your healthcare provider as directed:  Write down your questions so you remember to ask them during your visits  © 2017 [employer] Information is for End User's use only and may not be sold, redistributed or otherwise used for commercial purposes  All illustrations and images included in CareNotes® are the copyrighted property of A D A M , Inc  or Cristian Owen  The above information is an  only  It is not intended as medical advice for individual conditions or treatments  Talk to your doctor, nurse or pharmacist before following any medical regimen to see if it is safe and effective for you

## 2018-01-20 NOTE — PROGRESS NOTES
Assessment   1  History of Abscess of left thigh (682 6) (L02 416)    Plan    Clindamycin HCl - 300 MG Oral Capsule; TAKE 1 CAPSULE 3 times daily; Therapy: [id number] to (Evaluate:19Jul2017); Last Rx:12Jul2017; Status: ACTIVE Ordered     Rx By: Wade Duque; Dispense: 7 Days ; #:21 Capsule; Refill: 0;      For: PMH: Abscess of left thigh; SUPRIYA = N; Print Rx      Discussion/Summary   Discussion Summary:    Take antibiotic as directed  Yogurt avoid GI upset  Warm compress as directed  Monitor for increasing signs of infection  Medication Side Effects Reviewed: Possible side effects of new medications were reviewed with the patient/guardian today  Understands and agrees with treatment plan: The treatment plan was reviewed with the patient/guardian  The patient/guardian understands and agrees with the treatment plan    Follow Up Instructions: Follow Up with your Primary Care Provider in 1-2 days  If your symptoms worsen, go to the nearest [place] [address] Emergency Department  Chief Complaint   Chief Complaint Free Text Note Form: thought she had a bite on her L thigh which she noticed yesterday getting bigger, painful, red warm to the touch      History of Present Illness   HPI: This is a 29-year-old female complaining of abscess on left by x2 days  Patient reports abscess is red, warm to touch and painful  Patient denies any fever chills nausea vomiting diarrhea constipation  Patient thought initially it was a bug bite  Hospital Based Practices Required Assessment:      Pain Assessment      the patient states they have pain  The pain is located in the L thigh  (on a scale of 0 to 10, the patient rates the pain at 7 )      Abuse And Domestic Violence Screen       Yes, the patient is safe at home  -- The patient states no one is hurting them  Depression And Suicide Screen  No, the patient has not had thoughts of hurting themself  No, the patient has not felt depressed in the past 7 days  Prefered Language is  english  Primary Language is  english  Readiness To Learn: Receptive  Barriers To Learning: none  Preferred Learning: verbal      Review of Systems   Focused-Female:      Constitutional: No fever, no chills, feels well, no tiredness, no recent weight gain or loss  ENT: no ear ache, no loss of hearing, no nosebleeds or nasal discharge, no sore throat or hoarseness  Cardiovascular: no complaints of slow or fast heart rate, no chest pain, no palpitations, no leg claudication or lower extremity edema  Respiratory: no complaints of shortness of breath, no wheezing, no dyspnea on exertion, no orthopnea or PND  Breasts: no complaints of breast pain, breast lump or nipple discharge  Gastrointestinal: no complaints of abdominal pain, no constipation, no nausea or diarrhea, no vomiting, no bloody stools  Genitourinary: no complaints of dysuria, no incontinence, no pelvic pain, no dysmenorrhea, no vaginal discharge or abnormal vaginal bleeding  Musculoskeletal: no complaints of arthralgia, no myalgia, no joint swelling or stiffness, no limb pain or swelling  Integumentary: no complaints of skin rash or lesion, no itching or dry skin, no skin wounds-- and-- as noted in HPI  Neurological: no complaints of headache, no confusion, no numbness or tingling, no dizziness or fainting  Past Medical History   1  History of Closed Fracture Of The Left Clavicle (810 00)   2  History of pilonidal cyst (V13 3) (Z87 2)  Active Problems And Past Medical History Reviewed: The active problems and past medical history were reviewed and updated today  Family History    Father    1  Family history of migraine headaches (V17 2) (Z82 0)  Family History Reviewed: The family history was reviewed and updated today        Family history of Ovarian cancer             Family history of malignant neoplasm of breast (V16 3) (Z80 3)             Family history of malignant neoplasm of stomach (V16 0) (Z80 0)                  Social History   Social History Reviewed: The social history was reviewed and updated today  Surgical History   1  History of Pilonidal Cyst Resection  Surgical History Reviewed: The surgical history was reviewed and updated today  Current Meds    1  No Reported Medications Recorded  Medication List Reviewed: The medication list was reviewed and updated today  Allergies   1  Sulfa Drugs   2  Sulfamethoxazole POWD    Vitals   Signs   Recorded: [id number] 05:12PM   Temperature: 97 8 F  Heart Rate: 85  Respiration: 16  Systolic: 001  Diastolic: 91  Weight: 707 lb   BMI Calculated: 40 24  BSA Calculated: 1 99  Pain Scale: 7    Physical Exam        Skin      Examination of the skin for lesions: Abnormal  -- High left thigh: Abscess with erythema edema and tenderness palpation no fluctuance, warmth to touch        Signatures    Electronically signed by : Shamika Chilel, [facility]; Jan 18 2018  8:17PM EST                       (Author)     Electronically signed by : TOMMY Pires ; Jan 19 2018 10:14AM EST                       (Co-author)

## 2018-01-22 ENCOUNTER — GENERIC CONVERSION - ENCOUNTER (OUTPATIENT)
Dept: OTHER | Facility: OTHER | Age: 33
End: 2018-01-22

## 2018-01-22 VITALS
DIASTOLIC BLOOD PRESSURE: 88 MMHG | BODY MASS INDEX: 39.93 KG/M2 | WEIGHT: 217 LBS | HEART RATE: 96 BPM | HEIGHT: 62 IN | SYSTOLIC BLOOD PRESSURE: 126 MMHG

## 2018-01-22 VITALS
SYSTOLIC BLOOD PRESSURE: 110 MMHG | WEIGHT: 219 LBS | HEART RATE: 98 BPM | DIASTOLIC BLOOD PRESSURE: 84 MMHG | HEIGHT: 62 IN | BODY MASS INDEX: 40.3 KG/M2 | RESPIRATION RATE: 16 BRPM

## 2018-01-23 NOTE — MISCELLANEOUS
Message   Recorded as Task   Date: 01/18/2018 01:04 PM, Created By: Adelaide Cano   Task Name: Follow Up   Assigned To: Adelaide Cano   Regarding Patient: Briana Marrufo, Status: Active   CommentIsidro Roberts - 18 Jan 2018 1:04 PM     TASK CREATED  Caller: Self; General Medical Question; [phone] (Home); [phone] (Work)    Started letrazole on 12/29/17  Last three night she has been having a lot of LLQ pain in the area of ovaries and tubes  Pain is  8 8 1/2/10  States she feel swollen on and like something has popped  She feels slightly better today because she took a warm bath, but is still calling pain 8/10  Has also tried tylenol no relief  Routing to provider for advise  Vikash Craig - 18 Jan 2018 1:18 PM     TASK REASSIGNED: Previously Assigned To Anamaria Cruz - 18 Jan 2018 2:23 PM     TASK EDITED  Called patient back told her if she was in too much pain she should go to ED  Routing to provider for further recommendations  Torri Regan - 18 Jan 2018 2:53 PM     TASK REPLIED TO: Previously Assigned To Adelaide Cano  agree, or pt can be seen by Dr Isidro Roberts - 19 Jan 2018 7:44 AM     TASK EDITED        Active Problems    1  Adjustment disorder with anxiety (309 24) (F43 22)   2  Anovulatory cycle (628 0) (N97 0)   3  Endometriosis (617 9) (N80 9)   4  Female infertility (628 9) (N97 9)   5  Morbid obesity with BMI of 40 0-44 9, adult (278 01,V85 41) (E66 01,Z68 41)    Current Meds   1  Letrozole 2 5 MG Oral Tablet; take 2 tablet daily on cycle days 3-7; Therapy: [id number] to (Evaluate:[id number])  Requested for: 36Frg2615; Last   Rx:[id number] Ordered    Allergies    1  Sulfa Drugs   2  Sulfamethoxazole POWD    3  No Known Environmental Allergies   4   No Known Food Allergies    Signatures   Electronically signed by : Deepa Baig, ; Jan 19 2018  7:44AM EST                       (Author)

## 2018-01-23 NOTE — RESULT NOTES
Verified Results  FL HYSTEROSALPINGOGRAM [id number] 65:45ZW Olesyane Good Order Number: [id number]    - Patient Instructions: To schedule this appointment, please contact Central Scheduling at [phone]  Test Name Result Flag Reference   FL HYSTEROSALPINGOGRAM (Report)     HYSTEROSALPINGOGRAM     INDICATION: Infertility  History of endometriosis  COMPARISON: HSG dated 10/20/2015  IMAGES: 8     PHYSICIAN: Dr Reveles Travis:  1 4mft     CONTRAST: 3cc Omnipaque-240     FINDINGS:     Fluoroscopy guidance was provided for performance of hysterosalpingogram  Contrast was instilled into the uterus by the attending OB/GYN physician under direct fluoroscopic visualization  The uterus demonstrates normal contour and morphology without filling defect  No filling defects within the lower uterine segment after balloon deflation  The right fallopian tube opacifies normally with normal spill of contrast into the peritoneal cavity  The left fallopian tube opacifies normally with normal spill of contrast into the peritoneal cavity  IMPRESSION:     Normal hysterosalpingogram        Workstation performed: [id number]     Signed by:   Maribel Wilson MD   1/3/18     (1) ANTI-MULLERIAN HORMONE [id number] 10:57IK Robert Good Order Number: [id number]     Test Name Result Flag Reference   AMD 1 40 ng/mL     For assays employing antibodies, the possibility exists for  interference by heterophile antibodies in the samples  1  1  Heidi MUNOZ  Interferences in Immunoassays - still a  threat  Clin  Chem  2000; 46: 8993-5970  Reference Range:  Females 31 - 35y: 0 66 - 8 75  Median  3 00  AMH concentrations of >= 1 06 ng/mL is correlated with a  better response to ovarian stimulation, produced more  retrievable oocytes and higher odds of live birth according  to [facility] Pugh [facility] and Sterility  2010:  46:0334-0227    The current AMH test method correlates with  the study method with a slope of 0 94  Females at risk of ovarian hyperstimulation syndrome or  polycystic ovarian syndrome (PCOS) may exhibit elevated  serum AMH concentrations  AMH levels from PCOS patients  may be 2 to 5 fold higher than age-appropriate reference  interval values  Granulosa cell tumors of the ovary may secrete AMH along  with other tumor markers  Elevated AMH is not specific for  malignancy, and the assay should not be used exclusively to  diagnose or exclude an AMH-secreting ovarian tumor  Performed at:  [address] [facility] [facility]  [address] [place] [address]  [de-identified]  : Janet Varela MD, Phone:  [phone]     (1) ESTRADIOL [id number] 19:32UF Yaya Modest Order Number: [id number]     Test Name Result Flag Reference   ESTRADIOL 43 0 pg/mL     ESTRADIOL:    Mentruating Females: Follicular phase:  07 2-392 3  pg/mL      Mid-cycle phase:   49 9- 367 2 pg/mL      Luteal phase:      40 2-259    pg/mL     Postmenopausal females (untreated):  <11-58 3  pg/mL  On Menopausal Hormone Therapy (MHT): < 1 pg/mL    Women taking the drug Fulvestrant (Faslodex) may have falsely elevated Estradiol results  Suggest ordering [facility] Estradiol, LC/MS -test number [id number], to monitor these patients  * US PELVIS COMPLETE [facility] AND TRANSVAGINAL) [id number] 32:76OY Yaya Modest Order Number: [id number]    - Patient Instructions: To schedule this appointment, please contact Central Scheduling at [phone]  Test Name Result Flag Reference   US PELVIS COMPLETE (TRANSABDOMINAL AND TRANSVAGINAL) (Report)     PELVIC ULTRASOUND, COMPLETE     INDICATION: N80 9: Endometriosis, unspecified   N97 9: Female infertility, unspecified  History taken directly from the electronic ordering system              COMPARISON: July 24, 2017     TECHNIQUE:  Transabdominal pelvic ultrasound was performed in sagittal and transverse planes with a curvilinear transducer  Additional transvaginal imaging was performed to better evaluate the endometrium and ovaries  Imaging included volumetric    sweeps as well as traditional still imaging technique  FINDINGS:     UTERUS:   The uterus is anteverted in position, measuring 8 7 x 3 7 x 5 7 cm  Fundal 2 6 x 2 4 x 2 3 cm anterior intramural fibroid  The cervix shows simple and complex nabothian cysts  ENDOMETRIUM:    Normal caliber of 3 mm  Homogenous and normal in appearance  OVARIES/ADNEXA:   Right ovary: Normal size  No suspicious right ovarian abnormality  Doppler flow within normal limits  Left ovary: Normal size  No suspicious left ovarian abnormality  Doppler flow within normal limits  No suspicious adnexal mass or loculated collections  There is no free fluid  Incidental note of debris in the bladder, correlate for cystitis  IMPRESSION:       1  Fundal 2 6 x 2 4 x 2 3 cm anterior intramural uterine fibroid  Previously this measured 1 3 x 1 8 x 1 3 cm    2  Incidental note of debris in the bladder, correlate for cystitis            Workstation performed: [id number]     Signed by:   Serenity Garcia MD   12/31/17

## 2018-01-24 NOTE — MISCELLANEOUS
Message   Recorded as Task   Date: 01/18/2018 01:04 PM, Created By: Moses Penaloza   Task Name: Follow Up   Assigned To: Moses Penaloza   Regarding Patient: Landon Spence, Status: Active   CommentPortia Escobar - 18 Jan 2018 1:04 PM     TASK CREATED  Caller: Self; General Medical Question; [phone] (Home); [phone] (Work)    Started letrazole on 12/29/17  Last three night she has been having a lot of LLQ pain in the area of ovaries and tubes  Pain is  8 8 1/2/10  States she feel swollen on and like something has popped  She feels slightly better today because she took a warm bath, but is still calling pain 8/10  Has also tried tylenol no relief  Routing to provider for advise  Vikash Craig - 18 Jan 2018 1:18 PM     TASK REASSIGNED: Previously Assigned To Bibi Casas - 18 Jan 2018 2:23 PM     TASK EDITED  Called patient back told her if she was in too much pain she should go to ED  Routing to provider for further recommendations  Torri Regan - 18 Jan 2018 2:53 PM     TASK REPLIED TO: Previously Assigned To Moses Penaloza  agree, or pt can be seen by Dr Portia Escobar - 19 Jan 2018 7:44 AM     TASK EDITED   Moses Penaloza - 19 Jan 2018 7:44 AM     TASK EDITED  Maria Del Rosario Baird - 19 Jan 2018 8:51 AM     TASK REPLIED TO: Previously Assigned To Zita Baird  can you f/u with her today? if still that much pain, would rec Portia Escobar - 19 Jan 2018 11:03 AM     TASK EDITED  Patient states she went to ER yesterday had US  Everything looked ok, left ovary a bit swollen  Pt states they gave her toradol yesterday in ED and a prescription for naprosyn  States she hasn't taken yet  will bring home after work  She is feeling much better today  Do you still want another US? Routing to provider for advisgerardo Baird - 19 Jan 2018 12:18 PM     TASK REPLIED TO: Previously Assigned To [facility] Claudia  no, she is good  what ER did she go to? [facility] - 19 Jan 2018 12:44 PM     TASK EDITED  Patient went to [facility]  They did UPT negative  She is due for menses on 1/23 she would like to know what is the next step? Routing to provider  [facility] - 19 Jan 2018 12:44 PM     TASK EDITED   Rosalind Plate - 19 Jan 2018 1:40 PM     TASK REPLIED TO: Previously Assigned To Rosalind Plate  Repeat pregnancy test and next week if no menses  Can do up to 3 cycles of letrozole  If not pregnant by that point, would refer to Reproductive Endocrine  [facility] - 22 Jan 2018 5:05 PM     TASK EDITED  Patient would like to know if she can have a refill of letrozole  She needs a printed script due to insurance does not pay  Routing to provider for printed prescriptions  Rosalind Plate - 22 Jan 2018 5:15 PM     TASK REPLIED TO: Previously Assigned To Rosalind Plate  sent to [facility] - 22 Jan 2018 5:18 PM     TASK EDITED  Spoke with patient  Script will be in envelop at   Pt verbalized understanding  Active Problems    1  Adjustment disorder with anxiety (309 24) (F43 22)   2  Anovulatory cycle (628 0) (N97 0)   3  Endometriosis (617 9) (N80 9)   4  Female infertility (628 9) (N97 9)   5  Morbid obesity with BMI of 40 0-44 9, adult (278 01,V85 41) (E66 01,Z68 41)    Current Meds   1  Letrozole 2 5 MG Oral Tablet; take 2 tablet daily on cycle days 3-7; Therapy: [id number] to 0477 11 28 98)  Requested for: 11ZXA7789; Last   Rx:22Jan2018 Ordered    Allergies    1  Sulfa Drugs   2  Sulfamethoxazole POWD    3  No Known Environmental Allergies   4   No Known Food Allergies    Signatures   Electronically signed by : Amanda Johnson, ; Jan 22 2018  5:18PM EST                       (Author)

## 2018-02-05 ENCOUNTER — OFFICE VISIT (OUTPATIENT)
Dept: FAMILY MEDICINE CLINIC | Facility: CLINIC | Age: 33
End: 2018-02-05
Payer: COMMERCIAL

## 2018-02-05 ENCOUNTER — APPOINTMENT (OUTPATIENT)
Dept: LAB | Facility: MEDICAL CENTER | Age: 33
End: 2018-02-05
Payer: COMMERCIAL

## 2018-02-05 ENCOUNTER — TRANSCRIBE ORDERS (OUTPATIENT)
Dept: LAB | Facility: MEDICAL CENTER | Age: 33
End: 2018-02-05

## 2018-02-05 VITALS
SYSTOLIC BLOOD PRESSURE: 126 MMHG | HEART RATE: 88 BPM | TEMPERATURE: 99.6 F | HEIGHT: 62 IN | BODY MASS INDEX: 40.74 KG/M2 | WEIGHT: 221.4 LBS | DIASTOLIC BLOOD PRESSURE: 74 MMHG | OXYGEN SATURATION: 90 %

## 2018-02-05 DIAGNOSIS — R50.9 FEVER, UNSPECIFIED FEVER CAUSE: Primary | ICD-10-CM

## 2018-02-05 DIAGNOSIS — R50.9 FEVER, UNSPECIFIED FEVER CAUSE: ICD-10-CM

## 2018-02-05 PROCEDURE — 86710 INFLUENZA VIRUS ANTIBODY: CPT

## 2018-02-05 PROCEDURE — 99214 OFFICE O/P EST MOD 30 MIN: CPT | Performed by: FAMILY MEDICINE

## 2018-02-05 PROCEDURE — 36415 COLL VENOUS BLD VENIPUNCTURE: CPT

## 2018-02-05 PROCEDURE — 3008F BODY MASS INDEX DOCD: CPT | Performed by: FAMILY MEDICINE

## 2018-02-05 RX ORDER — DEXTROMETHORPHAN HYDROBROMIDE AND PROMETHAZINE HYDROCHLORIDE 15; 6.25 MG/5ML; MG/5ML
5 SYRUP ORAL 4 TIMES DAILY PRN
Qty: 118 ML | Refills: 1 | Status: SHIPPED | OUTPATIENT
Start: 2018-02-05 | End: 2018-09-25 | Stop reason: ALTCHOICE

## 2018-02-05 RX ORDER — OSELTAMIVIR PHOSPHATE 75 MG/1
75 CAPSULE ORAL EVERY 12 HOURS SCHEDULED
Qty: 10 CAPSULE | Refills: 0 | Status: SHIPPED | OUTPATIENT
Start: 2018-02-05 | End: 2018-02-10

## 2018-02-05 NOTE — LETTER
February 5, 2018     Patient: Shubham Lawrence   YOB: 1985   Date of Visit: 2/5/2018       To Whom it May Concern:    Shubham Lawrence is under my professional care  She was seen in my office on 2/5/2018  She may return to work on 02/08/2018  If you have any questions or concerns, please don't hesitate to call           Sincerely,          Jes Lizama MD        CC: No Recipients

## 2018-02-05 NOTE — PROGRESS NOTES
Assessment/Plan:    No problem-specific Assessment & Plan notes found for this encounter  Diagnoses and all orders for this visit:    Fever, unspecified fever cause  -     Influenza Type A/B Rt PCR W/Refl To H1N1 (2009); Future  -     promethazine-dextromethorphan (PHENERGAN-DM) 6 25-15 mg/5 mL oral syrup; Take 5 mL by mouth 4 (four) times a day as needed for cough  -     oseltamivir (TAMIFLU) 75 mg capsule; Take 1 capsule (75 mg total) by mouth every 12 (twelve) hours for 5 days      After discussing risks and benefits along with side effects with patient and given clinical presentation, she will be started on Tamiflu at this time  Also promethazine Dm for cough  She was advised to do oral hydration as much as possible  If symptoms worsen in the 2-3 days recommend to go to the Er  Follow up as needed    Subjective:      Patient ID: Clyde Shanks is a 33 y o  female  Fever  Patient complains of fever  She has had the fever for 3 days  Symptoms have been unchanged  Symptoms are described as chills and sweats, and are worse in the morning, in the middle of the day, in the afternoon, in the evening and at nighttime  Associated symptoms are body aches, chills, headache and poor appetite  Patient denies abdominal pain, diarrhea and otitis symptoms  She has tried to alleviate the symptoms with ibuprofen with minimal relief  The patient has no known comorbidities (structural heart/valvular disease, prosthetic joints, immunocompromised state, recent dental work, known abscesses)  The following portions of the patient's history were reviewed and updated as appropriate:   She  has a past medical history of Endometriosis  She  does not have a problem list on file  She  has a past surgical history that includes Abdominal surgery and Laparoscopic endometriosis fulguration  Her family history is not on file  She  reports that she has never smoked   She has never used smokeless tobacco  She reports that she drinks alcohol  She reports that she does not use drugs  Current Outpatient Prescriptions   Medication Sig Dispense Refill    naproxen (NAPROSYN) 500 mg tablet Take 1 tablet by mouth 2 (two) times a day with meals 14 tablet 0    oseltamivir (TAMIFLU) 75 mg capsule Take 1 capsule (75 mg total) by mouth every 12 (twelve) hours for 5 days 10 capsule 0    promethazine-dextromethorphan (PHENERGAN-DM) 6 25-15 mg/5 mL oral syrup Take 5 mL by mouth 4 (four) times a day as needed for cough 118 mL 1     No current facility-administered medications for this visit  Current Outpatient Prescriptions on File Prior to Visit   Medication Sig    naproxen (NAPROSYN) 500 mg tablet Take 1 tablet by mouth 2 (two) times a day with meals     No current facility-administered medications on file prior to visit  She is allergic to sulfamethoxazole-trimethoprim       Review of Systems   Constitutional: Positive for appetite change, chills, fatigue and fever  Negative for activity change  HENT: Positive for sore throat  Negative for congestion and ear discharge  Respiratory: Positive for cough and shortness of breath  Cardiovascular: Negative for chest pain and palpitations  Gastrointestinal: Negative for diarrhea and nausea  Musculoskeletal: Positive for arthralgias, back pain and myalgias  Skin: Negative for color change and rash  Neurological: Negative for dizziness and headaches  Psychiatric/Behavioral: Negative for agitation and behavioral problems  Objective:     Physical Exam   Constitutional: She is oriented to person, place, and time  She appears well-developed and well-nourished  She appears distressed  coughing appears sick   HENT:   Head: Normocephalic and atraumatic  Nose: Nose normal    Mouth/Throat: Oropharynx is clear and moist    Eyes: Conjunctivae are normal  Pupils are equal, round, and reactive to light  Cardiovascular: Normal rate, regular rhythm and normal heart sounds  No murmur heard  Pulmonary/Chest: Breath sounds normal  She is in respiratory distress  She has no wheezes  Abdominal: Soft  Bowel sounds are normal  She exhibits no distension  There is no tenderness  Neurological: She is alert and oriented to person, place, and time  Skin: Skin is warm  No rash noted  She is diaphoretic  No erythema  Psychiatric: She has a normal mood and affect

## 2018-02-08 ENCOUNTER — TELEPHONE (OUTPATIENT)
Dept: FAMILY MEDICINE CLINIC | Facility: CLINIC | Age: 33
End: 2018-02-08

## 2018-02-08 ENCOUNTER — APPOINTMENT (EMERGENCY)
Dept: RADIOLOGY | Facility: HOSPITAL | Age: 33
End: 2018-02-08
Payer: COMMERCIAL

## 2018-02-08 ENCOUNTER — HOSPITAL ENCOUNTER (EMERGENCY)
Facility: HOSPITAL | Age: 33
Discharge: HOME | End: 2018-02-08
Attending: EMERGENCY MEDICINE | Admitting: EMERGENCY MEDICINE
Payer: COMMERCIAL

## 2018-02-08 VITALS
TEMPERATURE: 98.6 F | OXYGEN SATURATION: 100 % | WEIGHT: 215 LBS | SYSTOLIC BLOOD PRESSURE: 123 MMHG | DIASTOLIC BLOOD PRESSURE: 72 MMHG | RESPIRATION RATE: 14 BRPM | HEART RATE: 92 BPM | BODY MASS INDEX: 39.32 KG/M2

## 2018-02-08 DIAGNOSIS — J11.1 INFLUENZA: Primary | ICD-10-CM

## 2018-02-08 DIAGNOSIS — R11.2 NAUSEA & VOMITING: ICD-10-CM

## 2018-02-08 DIAGNOSIS — R07.9 CHEST PAIN: ICD-10-CM

## 2018-02-08 LAB
ALBUMIN SERPL BCP-MCNC: 3.4 G/DL (ref 3.5–5)
ALP SERPL-CCNC: 127 U/L (ref 46–116)
ALT SERPL W P-5'-P-CCNC: 40 U/L (ref 12–78)
ANION GAP SERPL CALCULATED.3IONS-SCNC: 10 MMOL/L (ref 4–13)
AST SERPL W P-5'-P-CCNC: 18 U/L (ref 5–45)
BASOPHILS # BLD AUTO: 0.02 THOUSANDS/ΜL (ref 0–0.1)
BASOPHILS NFR BLD AUTO: 0 % (ref 0–1)
BILIRUB SERPL-MCNC: 0.4 MG/DL (ref 0.2–1)
BUN SERPL-MCNC: 8 MG/DL (ref 5–25)
CALCIUM SERPL-MCNC: 9.5 MG/DL (ref 8.3–10.1)
CHLORIDE SERPL-SCNC: 101 MMOL/L (ref 100–108)
CO2 SERPL-SCNC: 26 MMOL/L (ref 21–32)
CREAT SERPL-MCNC: 0.74 MG/DL (ref 0.6–1.3)
EOSINOPHIL # BLD AUTO: 0.17 THOUSAND/ΜL (ref 0–0.61)
EOSINOPHIL NFR BLD AUTO: 2 % (ref 0–6)
ERYTHROCYTE [DISTWIDTH] IN BLOOD BY AUTOMATED COUNT: 13.5 % (ref 11.6–15.1)
FLUAV AB TITR SER CF: ABNORMAL {TITER}
FLUBV AB TITR SER CF: ABNORMAL {TITER}
GFR SERPL CREATININE-BSD FRML MDRD: 108 ML/MIN/1.73SQ M
GLUCOSE SERPL-MCNC: 84 MG/DL (ref 65–140)
HCT VFR BLD AUTO: 40.2 % (ref 34.8–46.1)
HGB BLD-MCNC: 12.8 G/DL (ref 11.5–15.4)
LIPASE SERPL-CCNC: 128 U/L (ref 73–393)
LYMPHOCYTES # BLD AUTO: 2.73 THOUSANDS/ΜL (ref 0.6–4.47)
LYMPHOCYTES NFR BLD AUTO: 25 % (ref 14–44)
MCH RBC QN AUTO: 25.4 PG (ref 26.8–34.3)
MCHC RBC AUTO-ENTMCNC: 31.8 G/DL (ref 31.4–37.4)
MCV RBC AUTO: 80 FL (ref 82–98)
MONOCYTES # BLD AUTO: 0.75 THOUSAND/ΜL (ref 0.17–1.22)
MONOCYTES NFR BLD AUTO: 7 % (ref 4–12)
NEUTROPHILS # BLD AUTO: 7.32 THOUSANDS/ΜL (ref 1.85–7.62)
NEUTS SEG NFR BLD AUTO: 66 % (ref 43–75)
PLATELET # BLD AUTO: 319 THOUSANDS/UL (ref 149–390)
PMV BLD AUTO: 10.6 FL (ref 8.9–12.7)
POTASSIUM SERPL-SCNC: 3.6 MMOL/L (ref 3.5–5.3)
PROT SERPL-MCNC: 8.3 G/DL (ref 6.4–8.2)
RBC # BLD AUTO: 5.04 MILLION/UL (ref 3.81–5.12)
SODIUM SERPL-SCNC: 137 MMOL/L (ref 136–145)
TROPONIN I SERPL-MCNC: <0.02 NG/ML
WBC # BLD AUTO: 10.99 THOUSAND/UL (ref 4.31–10.16)

## 2018-02-08 PROCEDURE — 96361 HYDRATE IV INFUSION ADD-ON: CPT

## 2018-02-08 PROCEDURE — 83690 ASSAY OF LIPASE: CPT | Performed by: PHYSICIAN ASSISTANT

## 2018-02-08 PROCEDURE — 85025 COMPLETE CBC W/AUTO DIFF WBC: CPT | Performed by: PHYSICIAN ASSISTANT

## 2018-02-08 PROCEDURE — 36415 COLL VENOUS BLD VENIPUNCTURE: CPT | Performed by: PHYSICIAN ASSISTANT

## 2018-02-08 PROCEDURE — 84484 ASSAY OF TROPONIN QUANT: CPT | Performed by: PHYSICIAN ASSISTANT

## 2018-02-08 PROCEDURE — 71046 X-RAY EXAM CHEST 2 VIEWS: CPT

## 2018-02-08 PROCEDURE — 80053 COMPREHEN METABOLIC PANEL: CPT | Performed by: PHYSICIAN ASSISTANT

## 2018-02-08 PROCEDURE — 96375 TX/PRO/DX INJ NEW DRUG ADDON: CPT

## 2018-02-08 PROCEDURE — 99284 EMERGENCY DEPT VISIT MOD MDM: CPT

## 2018-02-08 PROCEDURE — 93005 ELECTROCARDIOGRAM TRACING: CPT | Performed by: PHYSICIAN ASSISTANT

## 2018-02-08 PROCEDURE — 96374 THER/PROPH/DIAG INJ IV PUSH: CPT

## 2018-02-08 RX ORDER — ACETAMINOPHEN 325 MG/1
975 TABLET ORAL ONCE
Status: COMPLETED | OUTPATIENT
Start: 2018-02-08 | End: 2018-02-08

## 2018-02-08 RX ORDER — DIPHENHYDRAMINE HYDROCHLORIDE 50 MG/ML
25 INJECTION INTRAMUSCULAR; INTRAVENOUS ONCE
Status: COMPLETED | OUTPATIENT
Start: 2018-02-08 | End: 2018-02-08

## 2018-02-08 RX ORDER — METOCLOPRAMIDE HYDROCHLORIDE 5 MG/ML
10 INJECTION INTRAMUSCULAR; INTRAVENOUS ONCE
Status: COMPLETED | OUTPATIENT
Start: 2018-02-08 | End: 2018-02-08

## 2018-02-08 RX ORDER — METOCLOPRAMIDE 10 MG/1
10 TABLET ORAL EVERY 6 HOURS PRN
Qty: 12 TABLET | Refills: 0 | Status: SHIPPED | OUTPATIENT
Start: 2018-02-08 | End: 2018-02-11

## 2018-02-08 RX ORDER — BENZONATATE 200 MG/1
200 CAPSULE ORAL 3 TIMES DAILY PRN
Qty: 21 CAPSULE | Refills: 0 | Status: SHIPPED | OUTPATIENT
Start: 2018-02-08 | End: 2018-02-15

## 2018-02-08 RX ORDER — MORPHINE SULFATE 4 MG/ML
4 INJECTION, SOLUTION INTRAMUSCULAR; INTRAVENOUS ONCE
Status: COMPLETED | OUTPATIENT
Start: 2018-02-08 | End: 2018-02-08

## 2018-02-08 RX ADMIN — MORPHINE SULFATE 4 MG: 4 INJECTION INTRAVENOUS at 17:38

## 2018-02-08 RX ADMIN — ACETAMINOPHEN 975 MG: 325 TABLET, FILM COATED ORAL at 16:37

## 2018-02-08 RX ADMIN — DIPHENHYDRAMINE HYDROCHLORIDE 25 MG: 50 INJECTION, SOLUTION INTRAMUSCULAR; INTRAVENOUS at 16:38

## 2018-02-08 RX ADMIN — METOCLOPRAMIDE 10 MG: 5 INJECTION, SOLUTION INTRAMUSCULAR; INTRAVENOUS at 16:38

## 2018-02-08 RX ADMIN — SODIUM CHLORIDE 1000 ML: 0.9 INJECTION, SOLUTION INTRAVENOUS at 14:31

## 2018-02-08 RX ADMIN — ONDANSETRON 8 MG: 2 INJECTION INTRAMUSCULAR; INTRAVENOUS at 15:03

## 2018-02-08 NOTE — ED PROVIDER NOTES
History  Chief Complaint   Patient presents with    Vomiting     Pt states that she has flu A and B since Sunday, Pt states that she is now vomiting  Pt states that she has been having chest pain since Thursday with anxiety  50-year-old female presents emergency room for evaluation of  Dehydration due to  Persistent nausea and vomiting  Patient states she has not urinated since last night  Admits to a feeling lightheaded and dizzy today  Four days ago she was diagnosed with the flu she has been having cough sore throat nasal congestion ear pain body aches  She has been taking DayQuil, NyQuil, Mucinex with minimal relief  Patient also complains of left-sided chest pain  She is unsure if this is from anxiety due to recent anniversary of her mother's death or the cough with vomiting  States she had a fever this morning for which she took a cool shower  History provided by:  Patient  Vomiting   Associated symptoms: cough, diarrhea (for 2 days, now resolved), fever, headaches, myalgias and sore throat    Associated symptoms: no abdominal pain        Prior to Admission Medications   Prescriptions Last Dose Informant Patient Reported? Taking?   naproxen (NAPROSYN) 500 mg tablet   No No   Sig: Take 1 tablet by mouth 2 (two) times a day with meals   oseltamivir (TAMIFLU) 75 mg capsule   No No   Sig: Take 1 capsule (75 mg total) by mouth every 12 (twelve) hours for 5 days   promethazine-dextromethorphan (PHENERGAN-DM) 6 25-15 mg/5 mL oral syrup   No No   Sig: Take 5 mL by mouth 4 (four) times a day as needed for cough      Facility-Administered Medications: None       Past Medical History:   Diagnosis Date    Endometriosis        Past Surgical History:   Procedure Laterality Date    ABDOMINAL SURGERY      LAPAROSCOPIC ENDOMETRIOSIS FULGURATION         History reviewed  No pertinent family history  I have reviewed and agree with the history as documented      Social History   Substance Use Topics    Smoking status: Never Smoker    Smokeless tobacco: Never Used    Alcohol use Yes        Review of Systems   Constitutional: Positive for fatigue and fever  HENT: Positive for congestion, ear pain and sore throat  Eyes: Negative for discharge and redness  Respiratory: Positive for cough  Cardiovascular: Positive for chest pain and palpitations  Gastrointestinal: Positive for diarrhea (for 2 days, now resolved) and vomiting  Negative for abdominal pain  Genitourinary: Negative for dysuria  Musculoskeletal: Positive for myalgias  Skin: Negative for rash  Neurological: Positive for dizziness, light-headedness and headaches  Negative for syncope  Psychiatric/Behavioral: Negative for confusion  Physical Exam  ED Triage Vitals   Temperature Pulse Respirations Blood Pressure SpO2   02/08/18 1234 02/08/18 1234 02/08/18 1234 02/08/18 1234 02/08/18 1234   98 6 °F (37 °C) (!) 115 22 (!) 173/89 100 %      Temp Source Heart Rate Source Patient Position - Orthostatic VS BP Location FiO2 (%)   02/08/18 1234 02/08/18 1234 02/08/18 1234 02/08/18 1234 --   Oral Monitor Sitting Left arm       Pain Score       02/08/18 1235       8           Orthostatic Vital Signs  Vitals:    02/08/18 1234 02/08/18 1500 02/08/18 1700   BP: (!) 173/89 (!) 194/96 135/76   Pulse: (!) 115 98 66   Patient Position - Orthostatic VS: Sitting         Physical Exam   Constitutional: She is oriented to person, place, and time  She appears well-developed and well-nourished  She appears distressed (mild, secondary to nausea and vomiting)  HENT:   Right Ear: Tympanic membrane and external ear normal    Left Ear: Tympanic membrane and external ear normal    Nose: Mucosal edema present  No rhinorrhea  Mouth/Throat: Uvula is midline  Mucous membranes are dry  No tonsillar exudate  Eyes: Conjunctivae are normal    Neck: Neck supple  Cardiovascular: Normal rate, regular rhythm and normal heart sounds      Pulmonary/Chest: Effort normal and breath sounds normal    Abdominal: Soft  Bowel sounds are normal  She exhibits no distension  There is no tenderness  Musculoskeletal: Normal range of motion  Lymphadenopathy:     She has no cervical adenopathy  Neurological: She is alert and oriented to person, place, and time  Skin: Skin is warm and dry  Capillary refill takes less than 2 seconds  No rash noted  Psychiatric: Her mood appears anxious  Nursing note and vitals reviewed  ED Medications  Medications   sodium chloride 0 9 % bolus 1,000 mL (0 mL Intravenous Stopped 2/8/18 1531)   ondansetron (ZOFRAN) 8 mg in sodium chloride 0 9 % 50 mL IVPB (0 mg Intravenous Stopped 2/8/18 1518)   metoclopramide (REGLAN) injection 10 mg (10 mg Intravenous Given 2/8/18 1638)   diphenhydrAMINE (BENADRYL) injection 25 mg (25 mg Intravenous Given 2/8/18 1638)   acetaminophen (TYLENOL) tablet 975 mg (975 mg Oral Given 2/8/18 1637)   morphine (PF) 4 mg/mL injection 4 mg (4 mg Intravenous Given 2/8/18 1738)       Diagnostic Studies  Results Reviewed     Procedure Component Value Units Date/Time    Comprehensive metabolic panel [[id number]]  (Abnormal) Collected:  02/08/18 1430    Lab Status:  Final result Specimen:  Blood from Arm, Left Updated:  02/08/18 1505     Sodium 137 mmol/L      Potassium 3 6 mmol/L      Chloride 101 mmol/L      CO2 26 mmol/L      Anion Gap 10 mmol/L      BUN 8 mg/dL      Creatinine 0 74 mg/dL      Glucose 84 mg/dL      Calcium 9 5 mg/dL      AST 18 U/L      ALT 40 U/L      Alkaline Phosphatase 127 (H) U/L      Total Protein 8 3 (H) g/dL      Albumin 3 4 (L) g/dL      Total Bilirubin 0 40 mg/dL      eGFR 108 ml/min/1 73sq m     Narrative:         National Kidney Disease Education Program recommendations are as follows:  GFR calculation is accurate only with a steady state creatinine  Chronic Kidney disease less than 60 ml/min/1 73 sq  meters  Kidney failure less than 15 ml/min/1 73 sq  meters      Lipase [[id number]]  (Normal) Collected:  02/08/18 1430    Lab Status:  Final result Specimen:  Blood from Arm, Left Updated:  02/08/18 1505     Lipase 128 u/L     Troponin I [[id number]]  (Normal) Collected:  02/08/18 1430    Lab Status:  Final result Specimen:  Blood from Arm, Left Updated:  02/08/18 1458     Troponin I <0 02 ng/mL     Narrative:         [employer] Chemistry analyzer 99% cutoff is > 0 04 ng/mL in network labs    o cTnI 99% cutoff is useful only when applied to patients in the clinical setting of myocardial ischemia  o cTnI 99% cutoff should be interpreted in the context of clinical history, ECG findings and possibly cardiac imaging to establish correct diagnosis  o cTnI 99% cutoff may be suggestive but clearly not indicative of a coronary event without the clinical setting of myocardial ischemia  CBC and differential [[id number]]  (Abnormal) Collected:  02/08/18 1430    Lab Status:  Final result Specimen:  Blood from Arm, Left Updated:  02/08/18 1439     WBC 10 99 (H) Thousand/uL      RBC 5 04 Million/uL      Hemoglobin 12 8 g/dL      Hematocrit 40 2 %      MCV 80 (L) fL      MCH 25 4 (L) pg      MCHC 31 8 g/dL      RDW 13 5 %      MPV 10 6 fL      Platelets 949 Thousands/uL      Neutrophils Relative 66 %      Lymphocytes Relative 25 %      Monocytes Relative 7 %      Eosinophils Relative 2 %      Basophils Relative 0 %      Neutrophils Absolute 7 32 Thousands/µL      Lymphocytes Absolute 2 73 Thousands/µL      Monocytes Absolute 0 75 Thousand/µL      Eosinophils Absolute 0 17 Thousand/µL      Basophils Absolute 0 02 Thousands/µL     POCT pregnancy, urine [[id number]]     Lab Status:  No result                  XR chest 2 views   Final Result by Fleeta Meigs, MD (02/08 1506)      No active pulmonary disease           Workstation performed: [id number]                    Procedures  ECG 12 Lead Documentation  Date/Time: 2/8/2018 3:32 PM  Performed by: Giovanna Cárdenas  Authorized by: Lelia Agudelo     Indications / Diagnosis:  Chest pain  ECG reviewed by me, the ED Provider: yes    Patient location:  ED  Interpretation:     Interpretation: normal    Rate:     ECG rate:  77    ECG rate assessment: normal    Rhythm:     Rhythm: sinus rhythm    Ectopy:     Ectopy: none    QRS:     QRS axis:  Normal  Conduction:     Conduction: normal    ST segments:     ST segments:  Normal  T waves:     T waves: normal             Phone Contacts  ED Phone Contact    ED Course  ED Course as of Feb 08 2014   u Feb 08, 2018   1733   Nausea and headache mildly improved  Patient leaning over a bucket coughing a lot therefore morphine ordered to help suppress cough    1946   Patient feeling much better, tolerating 2nd Jell-O cup  HEART Risk Score    Flowsheet Row Most Recent Value   History  0 Filed at: 02/08/2018 2013   ECG  0 Filed at: 02/08/2018 2013   Age  0 Filed at: 02/08/2018 2013   Risk Factors  0 Filed at: 02/08/2018 2013   Troponin  0 Filed at: 02/08/2018 2013   Heart Score Risk Calculator   History  0 Filed at: 02/08/2018 2013   ECG  0 Filed at: 02/08/2018 2013   Age  0 Filed at: 02/08/2018 2013   Risk Factors  0 Filed at: 02/08/2018 2013   Troponin  0 Filed at: 02/08/2018 2013   HEART Score  0 Filed at: 02/08/2018 2013   HEART Score  0 Filed at: 02/08/2018 2013                            MDM  Number of Diagnoses or Management Options  Influenza:   Nausea & vomiting:      Amount and/or Complexity of Data Reviewed  Clinical lab tests: ordered and reviewed  Tests in the radiology section of CPT®: ordered and reviewed    Risk of Complications, Morbidity, and/or Mortality  General comments: Differential diagnosis includes but is not limited to: flu, viral gastroenteritis, pna, uri, viral syndrome, anxiety, acs, pancreatitis, dehydration, electrolyte imblance      Patient Progress  Patient progress: improved    CritCare Time    Disposition  Final diagnoses:   Influenza   Nausea & vomiting   Chest pain     Time reflects when diagnosis was documented in both MDM as applicable and the Disposition within this note     Time User Action Codes Description Comment    2/8/2018  8:09 PM Berry Rodrigez Add [J11 1] Influenza     2/8/2018  8:09 PM Catiaellis Neelamdarlyn MUNOZ Add [R11 2] Nausea & vomiting     2/8/2018  8:13 PM Berry Rain Add [R07 9] Chest pain       ED Disposition     ED Disposition Condition Comment    Discharge  [address] [facility] discharge to home/self care  Condition at discharge: Good        Follow-up Information     Follow up With Specialties Details Why Contact Info Additional Information    Trista Lantigua MD Family Medicine In 3 days  21   [facility]  [address] [facility] Emergency Department Emergency Medicine  If symptoms worsen [address] [facility] [address] [phone]  ED,  [address] [place] [address], [phone]        Patient's Medications   Discharge Prescriptions    BENZONATATE (TESSALON) 200 MG CAPSULE    Take 1 capsule (200 mg total) by mouth 3 (three) times a day as needed for cough for up to 7 days       Start Date: 2/8/2018  End Date: 2/15/2018       Order Dose: 200 mg       Quantity: 21 capsule    Refills: 0    METOCLOPRAMIDE (REGLAN) 10 MG TABLET    Take 1 tablet (10 mg total) by mouth every 6 (six) hours as needed (nausea/vomiting) for up to 3 days       Start Date: 2/8/2018  End Date: 2/11/2018       Order Dose: 10 mg       Quantity: 12 tablet    Refills: 0     No discharge procedures on file      ED Provider  Electronically Signed by           Hitesh Chicas PA-C  02/08/18 2014

## 2018-02-08 NOTE — TELEPHONE ENCOUNTER
Patient called today to get the results of her influenza - advised with Dr Giovanna Harrison that she is positive for both Influenza A and B  Per Dr Giovanna Harrison patient is to go to nearest emergency room  Patient stated that she has been unable to drink/eat due to vomiting  Patient verbally understands and she stated that she was going to emergency room

## 2018-02-09 LAB
ATRIAL RATE: 77 BPM
P AXIS: 56 DEGREES
PR INTERVAL: 148 MS
QRS AXIS: 59 DEGREES
QRSD INTERVAL: 82 MS
QT INTERVAL: 380 MS
QTC INTERVAL: 430 MS
T WAVE AXIS: 17 DEGREES
VENTRICULAR RATE: 77 BPM

## 2018-02-09 PROCEDURE — 93010 ELECTROCARDIOGRAM REPORT: CPT | Performed by: INTERNAL MEDICINE

## 2018-02-09 NOTE — DISCHARGE INSTRUCTIONS
Acute Nausea and Vomiting   WHAT YOU NEED TO KNOW:   Acute nausea and vomiting start suddenly, worsen quickly, and last a short time  DISCHARGE INSTRUCTIONS:   Return to the emergency department if:   · You see blood in your vomit or your bowel movements  · You have sudden, severe pain in your chest and upper abdomen after hard vomiting or retching  · You have swelling in your neck and chest      · You are dizzy, cold, and thirsty and your eyes and mouth are dry  · You are urinating very little or not at all  · You have muscle weakness, leg cramps, and trouble breathing  · Your heart is beating much faster than normal      · You continue to vomit for more than 48 hours  Contact your healthcare provider if:   · You have frequent dry heaves (vomiting but nothing comes out)  · Your nausea and vomiting does not get better or go away after you use medicine  · You have questions or concerns about your condition or treatment  Medicines: You may need any of the following:  · Medicines  may be given to calm your stomach and stop your vomiting  You may also need medicines to help you feel more relaxed or to stop nausea and vomiting caused by motion sickness  · Gastrointestinal stimulants  are used to help empty your stomach and bowels  This may help decrease nausea and vomiting  · Take your medicine as directed  Contact your healthcare provider if you think your medicine is not helping or if you have side effects  Tell him or her if you are allergic to any medicine  Keep a list of the medicines, vitamins, and herbs you take  Include the amounts, and when and why you take them  Bring the list or the pill bottles to follow-up visits  Carry your medicine list with you in case of an emergency  Prevent or manage acute nausea and vomiting:   · Do not drink alcohol  Alcohol may upset or irritate your stomach  Too much alcohol can also cause acute nausea and vomiting  · Control stress    Headaches due to stress may cause nausea and vomiting  Find ways to relax and manage your stress  Get more rest and sleep  · Drink more liquids as directed  Vomiting can lead to dehydration  It is important to drink more liquids to help replace lost body fluids  Ask your healthcare provider how much liquid to drink each day and which liquids are best for you  Your provider may recommend that you drink an oral rehydration solution (ORS)  ORS contains water, salts, and sugar that are needed to replace the lost body fluids  Ask what kind of ORS to use, how much to drink, and where to get it  · Eat smaller meals, more often  Eat small amounts of food every 2 to 3 hours, even if you are not hungry  Food in your stomach may decrease your nausea  · Talk to your healthcare provider before you take over-the-counter (OTC) medicines  These medicines can cause serious problems if you use certain other medicines, or you have a medical condition  You may have problems if you use too much or use them for longer than the label says  Follow directions on the label carefully  Follow up with your healthcare provider as directed:  Write down your questions so you remember to ask them during your follow-up visits  © 2017 [address] [facility] Information is for End User's use only and may not be sold, redistributed or otherwise used for commercial purposes  All illustrations and images included in CareNotes® are the copyrighted property of [employer] , [employer]  or Cristian Owen  The above information is an  only  It is not intended as medical advice for individual conditions or treatments  Talk to your doctor, nurse or pharmacist before following any medical regimen to see if it is safe and effective for you  Influenza   WHAT YOU NEED TO KNOW:   Influenza (the flu) is an infection caused by the influenza virus   The flu is easily spread when an infected person coughs, sneezes, or has close contact with others  You may be able to spread the flu to others for 1 week or longer after signs or symptoms appear  DISCHARGE INSTRUCTIONS:   Call 911 for any of the following:   · You have trouble breathing, and your lips look purple or blue  · You have a seizure  Return to the emergency department if:   · You are dizzy, or you are urinating less or not at all  · You have a headache with a stiff neck, and you feel tired or confused  · You have new pain or pressure in your chest     · Your symptoms, such as shortness of breath, vomiting, or diarrhea, get worse  · Your symptoms, such as fever and coughing, seem to get better, but then get worse  Contact your healthcare provider if:   · You have new muscle pain or weakness  · You have questions or concerns about your condition or care  Medicines: You may need any of the following:  · Acetaminophen  decreases pain and fever  It is available without a doctor's order  Ask how much to take and how often to take it  Follow directions  Acetaminophen can cause liver damage if not taken correctly  · NSAIDs , such as ibuprofen, help decrease swelling, pain, and fever  This medicine is available with or without a doctor's order  NSAIDs can cause stomach bleeding or kidney problems in certain people  If you take blood thinner medicine, always ask your healthcare provider if NSAIDs are safe for you  Always read the medicine label and follow directions  · Antivirals  help fight a viral infection  · Take your medicine as directed  Contact your healthcare provider if you think your medicine is not helping or if you have side effects  Tell him or her if you are allergic to any medicine  Keep a list of the medicines, vitamins, and herbs you take  Include the amounts, and when and why you take them  Bring the list or the pill bottles to follow-up visits  Carry your medicine list with you in case of an emergency    Rest  as much as you can to help you recover  Drink liquids as directed  to help prevent dehydration  Ask how much liquid to drink each day and which liquids are best for you  Prevent the spread of influenza:   · Wash your hands often  Use soap and water  Wash your hands after you use the bathroom, change a child's diapers, or sneeze  Wash your hands before you prepare or eat food  Use gel hand cleanser when soap and water are not available  Do not touch your eyes, nose, or mouth unless you have washed your hands first            · Cover your mouth when you sneeze or cough  Cough into a tissue or the bend of your arm  · Clean shared items with a germ-killing   Clean table surfaces, doorknobs, and light switches  Do not share towels, silverware, and dishes with people who are sick  Wash bed sheets, towels, silverware, and dishes with soap and water  · Wear a mask  over your mouth and nose if you are sick or are near anyone who is sick  · Stay away from others  if you are sick  · Influenza vaccine  helps prevent influenza (flu)  Everyone older than 6 months should get a yearly influenza vaccine  Get the vaccine as soon as it is available, usually in September or October each year  Follow up with your healthcare provider as directed:  Write down your questions so you remember to ask them during your visits  © 2017 [address] [employer] Information is for End User's use only and may not be sold, redistributed or otherwise used for commercial purposes  All illustrations and images included in CareNotes® are the copyrighted property of A D A M [employer]  or Cristian Owen  The above information is an  only  It is not intended as medical advice for individual conditions or treatments  Talk to your doctor, nurse or pharmacist before following any medical regimen to see if it is safe and effective for you

## 2018-02-23 ENCOUNTER — TELEPHONE (OUTPATIENT)
Dept: OBGYN CLINIC | Facility: CLINIC | Age: 33
End: 2018-02-23

## 2018-02-23 NOTE — TELEPHONE ENCOUNTER
Feb was 2nd month of taking letrozole, was also in hosp with flu a&b, got period in hosp and is now bleeding again, thinks medication might be compromised, wants to talk to you maybe taking something else

## 2018-02-23 NOTE — TELEPHONE ENCOUNTER
I would probably recommend a break for now, wait for next period and do another cycle  Or she can schedule appt to discuss

## 2018-02-23 NOTE — TELEPHONE ENCOUNTER
Spoke with patient reviewed Dr Adam Kinney recommendation to break for now and wait for next period to do another cycle  Pt verbalized understanding  She is happy with doing that at this time  Was really sick with the flu and welcoming the break  Please call with any further concerns  Pt verbalized understanding  Routing to provider for update

## 2018-03-23 ENCOUNTER — TELEPHONE (OUTPATIENT)
Dept: OBGYN CLINIC | Facility: CLINIC | Age: 33
End: 2018-03-23

## 2018-03-23 DIAGNOSIS — N97.0 ANOVULATORY CYCLE: Primary | ICD-10-CM

## 2018-03-23 RX ORDER — LETROZOLE 2.5 MG/1
2.5 TABLET, FILM COATED ORAL DAILY
Qty: 5 TABLET | Refills: 0 | Status: SHIPPED | OUTPATIENT
Start: 2018-03-23 | End: 2018-09-25 | Stop reason: ALTCHOICE

## 2018-03-23 NOTE — TELEPHONE ENCOUNTER
Spoke to patient aware prescription called to pharmacy and she should start today  Verbalized understanding

## 2018-03-23 NOTE — TELEPHONE ENCOUNTER
Patient calling to find out if she should restart Letrozole  She is on her the third day of her menses  Unsure if you want to see her in the office first or just start another cycle  Please sent to [facility] if sending over the weekend  Routing to provider for advise

## 2018-04-17 ENCOUNTER — TELEPHONE (OUTPATIENT)
Dept: OBGYN CLINIC | Facility: CLINIC | Age: 33
End: 2018-04-17

## 2018-04-17 NOTE — TELEPHONE ENCOUNTER
She is did ovulation predictor kit but once she say the low she stopped doing it  It never went to peak  This is the third cycle of letrozole she started on March 23rd CD 3  She did not take a UPT   did get testosterone level tested and they are low  Routing to provider to update

## 2018-04-17 NOTE — TELEPHONE ENCOUNTER
Did she do ovulation predictor kits? How many cycles has she done? When did she take the letrozole? Did she take HPT?

## 2018-04-17 NOTE — TELEPHONE ENCOUNTER
Patient states she has been on letrozole for the past month  Sunday she had a bleeding was a light brown discharge  Since that no period  Also c/o left rib pain for about a week  Wants to know how to proceed from here  Routing to provider for further advise

## 2018-05-14 ENCOUNTER — TRANSCRIBE ORDERS (OUTPATIENT)
Dept: LAB | Facility: CLINIC | Age: 33
End: 2018-05-14

## 2018-05-14 ENCOUNTER — APPOINTMENT (OUTPATIENT)
Dept: LAB | Facility: CLINIC | Age: 33
End: 2018-05-14
Payer: COMMERCIAL

## 2018-05-14 DIAGNOSIS — Z22.4 CARRIER OF INFECTIONS WITH A PREDOMINANTLY SEXUAL MODE OF TRANSMISSION: ICD-10-CM

## 2018-05-14 DIAGNOSIS — R76.0 ELEVATED ANTIBODY LEVELS: ICD-10-CM

## 2018-05-14 DIAGNOSIS — E28.2 PCO (POLYCYSTIC OVARIES): Primary | ICD-10-CM

## 2018-05-14 DIAGNOSIS — E28.2 PCO (POLYCYSTIC OVARIES): ICD-10-CM

## 2018-05-14 DIAGNOSIS — E07.9 DISEASE OF THYROID GLAND: ICD-10-CM

## 2018-05-14 DIAGNOSIS — Z01.83 ENCOUNTER FOR BLOOD TYPING: ICD-10-CM

## 2018-05-14 DIAGNOSIS — Z11.59 SCREENING FOR VIRAL DISEASE: ICD-10-CM

## 2018-05-14 DIAGNOSIS — Z31.41 FERTILITY TESTING: ICD-10-CM

## 2018-05-14 DIAGNOSIS — Z11.3 VENEREAL DISEASE SCREENING: ICD-10-CM

## 2018-05-14 DIAGNOSIS — Z11.8: ICD-10-CM

## 2018-05-14 DIAGNOSIS — E66.8 OBESITY OF ENDOCRINE ORIGIN: ICD-10-CM

## 2018-05-14 DIAGNOSIS — D64.9 ANEMIA, UNSPECIFIED TYPE: ICD-10-CM

## 2018-05-14 LAB
ABO GROUP BLD: NORMAL
ALBUMIN SERPL BCP-MCNC: 3.3 G/DL (ref 3.5–5)
ALP SERPL-CCNC: 103 U/L (ref 46–116)
ALT SERPL W P-5'-P-CCNC: 38 U/L (ref 12–78)
ANION GAP SERPL CALCULATED.3IONS-SCNC: 8 MMOL/L (ref 4–13)
AST SERPL W P-5'-P-CCNC: 15 U/L (ref 5–45)
BASOPHILS # BLD AUTO: 0.01 THOUSANDS/ΜL (ref 0–0.1)
BASOPHILS NFR BLD AUTO: 0 % (ref 0–1)
BILIRUB SERPL-MCNC: 0.2 MG/DL (ref 0.2–1)
BLD GP AB SCN SERPL QL: NEGATIVE
BUN SERPL-MCNC: 8 MG/DL (ref 5–25)
CALCIUM SERPL-MCNC: 8.6 MG/DL (ref 8.3–10.1)
CHLORIDE SERPL-SCNC: 103 MMOL/L (ref 100–108)
CHOLEST SERPL-MCNC: 156 MG/DL (ref 50–200)
CO2 SERPL-SCNC: 27 MMOL/L (ref 21–32)
CREAT SERPL-MCNC: 0.62 MG/DL (ref 0.6–1.3)
EOSINOPHIL # BLD AUTO: 0.11 THOUSAND/ΜL (ref 0–0.61)
EOSINOPHIL NFR BLD AUTO: 2 % (ref 0–6)
ERYTHROCYTE [DISTWIDTH] IN BLOOD BY AUTOMATED COUNT: 13.8 % (ref 11.6–15.1)
EST. AVERAGE GLUCOSE BLD GHB EST-MCNC: 114 MG/DL
GFR SERPL CREATININE-BSD FRML MDRD: 120 ML/MIN/1.73SQ M
GLUCOSE P FAST SERPL-MCNC: 93 MG/DL (ref 65–99)
HBA1C MFR BLD: 5.6 % (ref 4.2–6.3)
HCT VFR BLD AUTO: 38.1 % (ref 34.8–46.1)
HDLC SERPL-MCNC: 56 MG/DL (ref 40–60)
HGB BLD-MCNC: 12.2 G/DL (ref 11.5–15.4)
INSULIN SERPL-ACNC: 19.5 MU/L (ref 3–25)
LDLC SERPL CALC-MCNC: 91 MG/DL (ref 0–100)
LYMPHOCYTES # BLD AUTO: 1.98 THOUSANDS/ΜL (ref 0.6–4.47)
LYMPHOCYTES NFR BLD AUTO: 35 % (ref 14–44)
MCH RBC QN AUTO: 26 PG (ref 26.8–34.3)
MCHC RBC AUTO-ENTMCNC: 32 G/DL (ref 31.4–37.4)
MCV RBC AUTO: 81 FL (ref 82–98)
MONOCYTES # BLD AUTO: 0.28 THOUSAND/ΜL (ref 0.17–1.22)
MONOCYTES NFR BLD AUTO: 5 % (ref 4–12)
NEUTROPHILS # BLD AUTO: 3.29 THOUSANDS/ΜL (ref 1.85–7.62)
NEUTS SEG NFR BLD AUTO: 58 % (ref 43–75)
NONHDLC SERPL-MCNC: 100 MG/DL
PLATELET # BLD AUTO: 291 THOUSANDS/UL (ref 149–390)
PMV BLD AUTO: 10.5 FL (ref 8.9–12.7)
POTASSIUM SERPL-SCNC: 3.9 MMOL/L (ref 3.5–5.3)
PROLACTIN SERPL-MCNC: 9.2 NG/ML
PROT SERPL-MCNC: 7.1 G/DL (ref 6.4–8.2)
RBC # BLD AUTO: 4.7 MILLION/UL (ref 3.81–5.12)
RH BLD: POSITIVE
RPR SER QL: NORMAL
RUBV IGG SERPL IA-ACNC: >175 IU/ML
SODIUM SERPL-SCNC: 138 MMOL/L (ref 136–145)
SPECIMEN EXPIRATION DATE: NORMAL
T3FREE SERPL-MCNC: 2.57 PG/ML (ref 2.3–4.2)
T4 FREE SERPL-MCNC: 1.23 NG/DL (ref 0.76–1.46)
TRIGL SERPL-MCNC: 47 MG/DL
TSH SERPL DL<=0.05 MIU/L-ACNC: 1.3 UIU/ML (ref 0.36–3.74)
WBC # BLD AUTO: 5.67 THOUSAND/UL (ref 4.31–10.16)

## 2018-05-14 PROCEDURE — 86631 CHLAMYDIA ANTIBODY: CPT

## 2018-05-14 PROCEDURE — 84481 FREE ASSAY (FT-3): CPT

## 2018-05-14 PROCEDURE — 86705 HEP B CORE ANTIBODY IGM: CPT

## 2018-05-14 PROCEDURE — 80061 LIPID PANEL: CPT

## 2018-05-14 PROCEDURE — 80053 COMPREHEN METABOLIC PANEL: CPT

## 2018-05-14 PROCEDURE — 84443 ASSAY THYROID STIM HORMONE: CPT

## 2018-05-14 PROCEDURE — 87591 N.GONORRHOEAE DNA AMP PROB: CPT

## 2018-05-14 PROCEDURE — 86803 HEPATITIS C AB TEST: CPT

## 2018-05-14 PROCEDURE — 83036 HEMOGLOBIN GLYCOSYLATED A1C: CPT | Performed by: OBSTETRICS & GYNECOLOGY

## 2018-05-14 PROCEDURE — 86632 CHLAMYDIA IGM ANTIBODY: CPT

## 2018-05-14 PROCEDURE — 87491 CHLMYD TRACH DNA AMP PROBE: CPT

## 2018-05-14 PROCEDURE — 80081 OBSTETRIC PANEL INC HIV TSTG: CPT

## 2018-05-14 PROCEDURE — 83498 ASY HYDROXYPROGESTERONE 17-D: CPT

## 2018-05-14 PROCEDURE — 86787 VARICELLA-ZOSTER ANTIBODY: CPT

## 2018-05-14 PROCEDURE — 84439 ASSAY OF FREE THYROXINE: CPT

## 2018-05-14 PROCEDURE — 86704 HEP B CORE ANTIBODY TOTAL: CPT

## 2018-05-14 PROCEDURE — 84402 ASSAY OF FREE TESTOSTERONE: CPT

## 2018-05-14 PROCEDURE — 84146 ASSAY OF PROLACTIN: CPT

## 2018-05-14 PROCEDURE — 84403 ASSAY OF TOTAL TESTOSTERONE: CPT

## 2018-05-14 PROCEDURE — 87661 TRICHOMONAS VAGINALIS AMPLIF: CPT

## 2018-05-14 PROCEDURE — 36415 COLL VENOUS BLD VENIPUNCTURE: CPT | Performed by: OBSTETRICS & GYNECOLOGY

## 2018-05-14 PROCEDURE — 83516 IMMUNOASSAY NONANTIBODY: CPT

## 2018-05-14 PROCEDURE — 86645 CMV ANTIBODY IGM: CPT

## 2018-05-14 PROCEDURE — 82627 DEHYDROEPIANDROSTERONE: CPT

## 2018-05-14 PROCEDURE — 86790 VIRUS ANTIBODY NOS: CPT

## 2018-05-14 PROCEDURE — 83525 ASSAY OF INSULIN: CPT

## 2018-05-14 PROCEDURE — 86644 CMV ANTIBODY: CPT

## 2018-05-15 LAB
CHLAMYDIA DNA CVX QL NAA+PROBE: NORMAL
CMV IGG SERPL IA-ACNC: 5.6 U/ML (ref 0–0.59)
CMV IGM SERPL IA-ACNC: <30 AU/ML (ref 0–29.9)
DHEA-S SERPL-MCNC: 394.3 UG/DL (ref 84.8–378)
HBV CORE AB SER QL: NORMAL
HBV CORE IGM SER QL: NORMAL
HBV SURFACE AG SER QL: NORMAL
HCV AB SER QL: NORMAL
HTLV I+II AB SER QL IA: NEGATIVE
N GONORRHOEA DNA GENITAL QL NAA+PROBE: NORMAL
TESTOST FREE SERPL-MCNC: 6.3 PG/ML (ref 0–4.2)
TESTOST SERPL-MCNC: 64 NG/DL (ref 8–48)
VZV IGG SER IA-ACNC: NORMAL

## 2018-05-16 LAB
C TRACH IGM TITR SER IF: <0.8 INDEX (ref 0–0.7)
CHLAMYDIA IGG SER-ACNC: <0.91 RATIO (ref 0–0.9)
HIV 1+2 AB+HIV1 P24 AG SERPL QL IA: NORMAL
T VAGINALIS RRNA SPEC QL NAA+PROBE: NEGATIVE

## 2018-05-17 LAB — MIS SERPL-MCNC: 1.43 NG/ML

## 2018-05-17 PROCEDURE — 88305 TISSUE EXAM BY PATHOLOGIST: CPT | Performed by: PATHOLOGY

## 2018-05-18 ENCOUNTER — LAB REQUISITION (OUTPATIENT)
Dept: LAB | Facility: HOSPITAL | Age: 33
End: 2018-05-18
Payer: COMMERCIAL

## 2018-05-18 DIAGNOSIS — N71.1 CHRONIC INFLAMMATORY DISEASE OF UTERUS: ICD-10-CM

## 2018-05-24 LAB — 17OHP SERPL-MCNC: 19 NG/DL

## 2018-09-25 ENCOUNTER — CONSULT (OUTPATIENT)
Dept: PERINATAL CARE | Facility: MEDICAL CENTER | Age: 33
End: 2018-09-25
Payer: COMMERCIAL

## 2018-09-25 VITALS
DIASTOLIC BLOOD PRESSURE: 76 MMHG | HEIGHT: 61 IN | BODY MASS INDEX: 40.59 KG/M2 | SYSTOLIC BLOOD PRESSURE: 136 MMHG | HEART RATE: 99 BPM | WEIGHT: 215 LBS

## 2018-09-25 DIAGNOSIS — Z31.69 ENCOUNTER FOR PRECONCEPTION CONSULTATION: Primary | ICD-10-CM

## 2018-09-25 DIAGNOSIS — E66.01 MORBID OBESITY (HCC): ICD-10-CM

## 2018-09-25 DIAGNOSIS — N97.0 INFERTILITY ASSOCIATED WITH ANOVULATION: ICD-10-CM

## 2018-09-25 DIAGNOSIS — R94.31 ABNORMAL FINDING ON EKG: ICD-10-CM

## 2018-09-25 PROCEDURE — 99242 OFF/OP CONSLTJ NEW/EST SF 20: CPT | Performed by: OBSTETRICS & GYNECOLOGY

## 2018-09-30 PROBLEM — N97.0 INFERTILITY ASSOCIATED WITH ANOVULATION: Status: ACTIVE | Noted: 2018-09-30

## 2018-09-30 PROBLEM — E66.01 MORBID OBESITY (HCC): Status: ACTIVE | Noted: 2018-09-30

## 2018-09-30 PROBLEM — N80.9 ENDOMETRIOSIS: Status: ACTIVE | Noted: 2017-07-20

## 2018-09-30 PROBLEM — N97.0 ANOVULATORY CYCLE: Status: ACTIVE | Noted: 2017-12-06

## 2018-09-30 NOTE — PROGRESS NOTES
PRECONCEPTION CONSULTATION: MATERNAL-FETAL MEDICINE    Referring physician:   [address]  [phone] [place] [id number] Tamia Calderón    Reason for consultation:   Chief Complaint   Patient presents with   Anastasia Kern     Dear Dr Queta Kendall,    Thank you for referring patient Daniel Batista for preconception Maternal-Fetal Medicine consultation regarding her risks for future pregnancy given her bmi >40  As you know, Ms Romelia Sepulveda is a 29y o  year-old    She is requesting assistance in becoming pregnant and recently failed a Letrozole ovulation induction  She was referred to Dr Frankie Baird who requested a preconceptual consult for Florencia due to her bmi of >40 which can increase her complication rate during pregnancy  Her history is as follows:    Past Medical History:   Diagnosis Date    Closed fracture of left clavicle     last assessed 14    Endometriosis     Pilonidal cyst      Past surgical history:   Past Surgical History:   Procedure Laterality Date    LAPAROSCOPIC ENDOMETRIOSIS FULGURATION      LAPAROSCOPY      diagnostic    PILONIDAL CYST EXCISION      resection       Obstetric history:      Gynecologic history: She denied having any abnormal pap smears  She has a past medical history of endometriosus  Social HX  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[place] [address]  She denies any history of smoking, alcohol or illicit drug use  She is happily     Family History   Problem Relation Age of Onset   Leo Bhagat Breast cancer Mother     Cancer Mother         stomach    Ovarian cancer Mother     Hypertension Father          Current Outpatient Prescriptions:     Cholecalciferol (VITAMIN D PO), Take by mouth, Disp: , Rfl:     Prenatal Vit-Fe Fumarate-FA (PRENATAL COMPLETE PO), Take by mouth, Disp: , Rfl:     Allergies   Allergen Reactions    Sulfamethoxazole-Trimethoprim Hives     Category: Allergy;         Exam:  Vitals: Blood pressure 136/76, pulse 99, height 5' 1" (1 549 m), weight 97 5 kg (215 lb)  Initial bp on arrival was 137/90  General appearance: alert, well appearing, and in no distress  The remainder of her physical examination was deferred as she was here today for consultation and discussion  Review of Systems   All other systems reviewed and are negative  Assessment and Plan: Ms Robbie Leary is a 29y o  year-old para No obstetric history on file  here for preconception counseling  By issue:  Patient Active Problem List   Diagnosis    Morbid obesity ([facility] )    BMI 40 0-44 9, adult ([facility] )    Infertility associated with anovulation     IVF work up   Normal TSH, rubella immune, normal lipid panel, normal hepatitis-C antibody, no evidence of chronic hepatitis-B, normal insulin level during the fasting state, hemoglobin A1c of 5 6 percent, B positive blood type, normal CMP  Normal HSG 18  Ultrasound on 18 shows a fundal leiomyoma measuring 3 centimeters with an otherwise normal size uterus and normal ovaries  In review of her chart I found an ekg during an ER visit for vomiting on 2018 which was read as normal by her ER physician but then the formal report documents an abnormal EKG with a nonspecific T-wave abnormality with a normal sinus rhythm  This may be a falsely positive result  Prior EKG in 10/7/2014 was read as normal        Summary of Recommendations:    1  Maternal obesity is associated with an increased risk for adverse pregnancy outcomes, including gestational diabetes, fetal growth abnormalities including macrosomia, fetal structural abnormalities, preeclampsia, venous thromboembolism, stillbirth at term, and increased likelihood for  section  Serial fetal growth evaluations are recommended during the second half of the pregnancy, along with weekly third trimester antepartum fetal heart rate testing starting at 36 weeks    Early screening for gestational diabetes should be considered, with repeat evaluation between 25 and 28 weeks gestation, if initially negative  Risks due to her increased bmi would certainly be less if she was able to lose weight prior to pregnancy  2   Would recommend a repeat EKG now that she is no longer ill and if still abnormal then recommend a cardiology consult  3   Recommend limited weight gain of 11-20 pounds  Heavier weight gains increase her risk for developing preeclampsia and gestational diabetes  4   Recommend offering screening for maternal hx of carrier states for cystic fibrosis, spinal muscular atrophy and fragile x if not offering pan carrier screening through [facility]  5   Due to her bmi > 40 daily baby aspirin started after the first trimester may be able to lessen her risk to develop preeclampsia in a future pregnancy  6  Her small fibroid is not a contraindication for prenancy  A total of 30 minutes were spent in this encounter with >50% of the time spent in face-to-face counseling and in coordination of care  At the conclusion of today's encounter, all questions were answered to her satisfaction  Thank you very much for this kind referral and please do not hesitate to contact me with any further questions or concerns      Sincerely,    Zohra Bowles MD  Attending Physician, [place]

## 2018-11-08 ENCOUNTER — OFFICE VISIT (OUTPATIENT)
Dept: FAMILY MEDICINE CLINIC | Facility: CLINIC | Age: 33
End: 2018-11-08
Payer: COMMERCIAL

## 2018-11-08 VITALS
HEIGHT: 61 IN | OXYGEN SATURATION: 97 % | WEIGHT: 217 LBS | TEMPERATURE: 98.6 F | HEART RATE: 90 BPM | BODY MASS INDEX: 40.97 KG/M2 | SYSTOLIC BLOOD PRESSURE: 120 MMHG | DIASTOLIC BLOOD PRESSURE: 84 MMHG

## 2018-11-08 DIAGNOSIS — R94.31 ABNORMAL EKG: Primary | ICD-10-CM

## 2018-11-08 DIAGNOSIS — N97.0 INFERTILITY ASSOCIATED WITH ANOVULATION: ICD-10-CM

## 2018-11-08 PROBLEM — E28.2 PCOS (POLYCYSTIC OVARIAN SYNDROME): Status: ACTIVE | Noted: 2018-11-08

## 2018-11-08 PROCEDURE — 93000 ELECTROCARDIOGRAM COMPLETE: CPT | Performed by: FAMILY MEDICINE

## 2018-11-08 PROCEDURE — 99213 OFFICE O/P EST LOW 20 MIN: CPT | Performed by: FAMILY MEDICINE

## 2018-11-08 PROCEDURE — 3008F BODY MASS INDEX DOCD: CPT | Performed by: FAMILY MEDICINE

## 2018-11-08 PROCEDURE — 1036F TOBACCO NON-USER: CPT | Performed by: FAMILY MEDICINE

## 2018-11-08 NOTE — PROGRESS NOTES
Assessment/Plan:     Diagnoses and all orders for this visit:    Abnormal EKG  -     POCT ECG    Infertility associated with anovulation        Patient's EKG is normal   No further cardiac testing is necessary  Will fax results to [facility] infertility  Subjective:      Patient ID: Joseph Thurston is a 29 y o  female  36-year-old female here for a cardiac exam and EKG  She is undergoing infertility workup and treatment and she states she needs to have a normal EKG  There have been abnormal EKGs in her chart flagged from when she was hospitalized with the flu  She needs a repeat EKG and cardiac clearance  Patient is completely asymptomatic she denies any chest pain, shortness of breath or edema  Today her EKG is completely normal         The following portions of the patient's history were reviewed and updated as appropriate:   She  has a past medical history of Closed fracture of left clavicle; Endometriosis; and Pilonidal cyst   She   Patient Active Problem List    Diagnosis Date Noted    PCOS (polycystic ovarian syndrome) 11/08/2018    [facility]bid [facility] ) 09/30/2018    BMI 40 0-44 9, adult [facility] ) 09/30/2018    Infertility associated with anovulation 09/30/2018    Anovulatory cycle 12/06/2017    Endometriosis 07/20/2017     She  has a past surgical history that includes Laparoscopic endometriosis fulguration (2013); LAPAROSCOPY; and PILONIDAL CYST EXCISION  Her family history includes Breast cancer in her mother; Cancer in her mother; Hypertension in her father; Ovarian cancer in her mother  She  reports that she has never smoked  She has never used smokeless tobacco  She reports that she drinks alcohol  She reports that she does not use drugs  Current Outpatient Prescriptions   Medication Sig Dispense Refill    Cholecalciferol (VITAMIN D PO) Take by mouth      Prenatal Vit-Fe Fumarate-FA (PRENATAL COMPLETE PO) Take by mouth       No current facility-administered medications for this visit  Current Outpatient Prescriptions on File Prior to Visit   Medication Sig    Cholecalciferol (VITAMIN D PO) Take by mouth    Prenatal Vit-Fe Fumarate-FA (PRENATAL COMPLETE PO) Take by mouth     No current facility-administered medications on file prior to visit  She is allergic to sulfamethoxazole-trimethoprim       Review of Systems   Constitutional: Negative for activity change  Respiratory: Negative for chest tightness and shortness of breath  Cardiovascular: Negative for chest pain and leg swelling  Neurological: Negative for headaches  Psychiatric/Behavioral: Negative for dysphoric mood  The patient is not nervous/anxious  Objective:      /84   Pulse 90   Temp 98 6 °F (37 °C)   Ht 5' 1" (1 549 m)   Wt 98 4 kg (217 lb)   SpO2 97%   BMI 41 00 kg/m²          Physical Exam   Constitutional: She is oriented to person, place, and time  She appears well-developed and well-nourished  No distress  HENT:   Head: Normocephalic and atraumatic  Eyes: Pupils are equal, round, and reactive to light  Conjunctivae are normal    Neck: Neck supple  Cardiovascular: Normal rate, regular rhythm and normal heart sounds  Exam reveals no gallop and no friction rub  No murmur heard  Pulmonary/Chest: Effort normal and breath sounds normal  No respiratory distress  She has no wheezes  She has no rales  She exhibits no tenderness  Musculoskeletal: Normal range of motion  She exhibits no edema  Neurological: She is alert and oriented to person, place, and time  Skin: Skin is warm and dry  No rash noted  She is not diaphoretic  Psychiatric: She has a normal mood and affect  Her behavior is normal  Judgment and thought content normal    Nursing note and vitals reviewed  EKG: normal EKG, normal sinus rhythm, no ischemic changes

## 2018-12-21 ENCOUNTER — OFFICE VISIT (OUTPATIENT)
Dept: FAMILY MEDICINE CLINIC | Facility: CLINIC | Age: 33
End: 2018-12-21
Payer: COMMERCIAL

## 2018-12-21 VITALS
WEIGHT: 214.4 LBS | BODY MASS INDEX: 40.48 KG/M2 | SYSTOLIC BLOOD PRESSURE: 128 MMHG | HEIGHT: 61 IN | OXYGEN SATURATION: 98 % | HEART RATE: 82 BPM | TEMPERATURE: 98.9 F | DIASTOLIC BLOOD PRESSURE: 80 MMHG

## 2018-12-21 DIAGNOSIS — R05.3 CHRONIC COUGHING: Primary | ICD-10-CM

## 2018-12-21 PROCEDURE — 99213 OFFICE O/P EST LOW 20 MIN: CPT | Performed by: NURSE PRACTITIONER

## 2018-12-21 PROCEDURE — 1036F TOBACCO NON-USER: CPT | Performed by: NURSE PRACTITIONER

## 2018-12-21 PROCEDURE — 3008F BODY MASS INDEX DOCD: CPT | Performed by: NURSE PRACTITIONER

## 2018-12-21 NOTE — PROGRESS NOTES
Assessment/Plan:    No problem-specific Assessment & Plan notes found for this encounter  Diagnoses and all orders for this visit:    Chronic coughing  Comments:  DW patient will start allergy medication claritan and call if not improving will order cxy and PFT           Subjective:      Patient ID: Fanny Min is a 29 y o  female  Patient here with complaints that she has been having a cough present for the past several months off and on and thinking may be related to her allergies  Patient denies any fevers or SOB or coughing up mucus  Patient feeling well just having a cough      Sore Throat    This is a recurrent problem  The current episode started 1 to 4 weeks ago  The problem has been gradually worsening  The maximum temperature recorded prior to her arrival was 100 - 100 9 F  The fever has been present for 1 to 2 days  The pain is at a severity of 4/10  Associated symptoms include congestion, coughing and a hoarse voice  Pertinent negatives include no abdominal pain, diarrhea, drooling, ear discharge, ear pain, headaches, plugged ear sensation, neck pain, shortness of breath, stridor, swollen glands, trouble swallowing or vomiting  She has had no exposure to strep or mono  The following portions of the patient's history were reviewed and updated as appropriate:   She  has a past medical history of Closed fracture of left clavicle; Endometriosis; and Pilonidal cyst   She   Patient Active Problem List    Diagnosis Date Noted    Chronic coughing 12/21/2018    PCOS (polycystic ovarian syndrome) 11/08/2018    [facility]bid [facility] ([facility] ) 09/30/2018    BMI 40 0-44 9, adult ([facility] ) 09/30/2018    Infertility associated with anovulation 09/30/2018    Anovulatory cycle 12/06/2017    Endometriosis 07/20/2017     She  has a past surgical history that includes Laparoscopic endometriosis fulguration (2013); LAPAROSCOPY; and PILONIDAL CYST EXCISION    Her family history includes Breast cancer in her mother; Cancer in her mother; Hypertension in her father; Ovarian cancer in her mother  She  reports that she has never smoked  She has never used smokeless tobacco  She reports that she drinks alcohol  She reports that she does not use drugs  She is allergic to sulfamethoxazole-trimethoprim       Review of Systems   Constitutional: Negative  HENT: Positive for congestion, hoarse voice and sore throat  Negative for drooling, ear discharge, ear pain and trouble swallowing  Eyes: Negative  Respiratory: Positive for cough  Negative for shortness of breath and stridor  Cardiovascular: Negative  Gastrointestinal: Negative for abdominal pain, diarrhea and vomiting  Endocrine: Negative  Genitourinary: Negative  Musculoskeletal: Negative  Negative for neck pain  Neurological: Negative for headaches  Objective:      /80   Pulse 82   Temp 98 9 °F (37 2 °C)   Ht 5' 1" (1 549 m)   Wt 97 3 kg (214 lb 6 4 oz)   SpO2 98%   BMI 40 51 kg/m²          Physical Exam   Constitutional: She is oriented to person, place, and time  Vital signs are normal  She appears well-developed and well-nourished  No distress  HENT:   Head: Normocephalic and atraumatic  Eyes: Pupils are equal, round, and reactive to light  Neck: Normal range of motion  No thyromegaly present  Cardiovascular: Normal rate, regular rhythm, normal heart sounds and intact distal pulses  No murmur heard  Pulmonary/Chest: Effort normal and breath sounds normal  No respiratory distress  She has no wheezes  Abdominal: Soft  Bowel sounds are normal    Musculoskeletal: Normal range of motion  Neurological: She is alert and oriented to person, place, and time  Skin: Skin is warm and dry  Psychiatric: She has a normal mood and affect  Nursing note and vitals reviewed

## 2018-12-21 NOTE — PROGRESS NOTES
Answers for HPI/ROS submitted by the patient on 12/18/2018   Sore throat  Chronicity: recurrent  Onset: 1 to 4 weeks ago  Progression since onset: gradually worsening  Fever: 100 - 100 9 F  Fever duration: 1 to 2 days  Pain - numeric: 4/10  abdominal pain: No  congestion: Yes  cough: Yes  diarrhea: No  drooling: No  ear discharge: No  ear pain: No  headaches: No  hoarse voice: Yes  neck pain: No  plugged ear sensation: No  shortness of breath: No  stridor: No  swollen glands: No  trouble swallowing: No  vomiting: No  strep: No  mono:  No

## 2019-02-09 ENCOUNTER — APPOINTMENT (EMERGENCY)
Dept: CT IMAGING | Facility: HOSPITAL | Age: 34
End: 2019-02-09
Payer: COMMERCIAL

## 2019-02-09 ENCOUNTER — HOSPITAL ENCOUNTER (EMERGENCY)
Facility: HOSPITAL | Age: 34
Discharge: HOME | End: 2019-02-09
Attending: EMERGENCY MEDICINE | Admitting: EMERGENCY MEDICINE
Payer: COMMERCIAL

## 2019-02-09 VITALS
RESPIRATION RATE: 18 BRPM | DIASTOLIC BLOOD PRESSURE: 79 MMHG | OXYGEN SATURATION: 99 % | HEART RATE: 69 BPM | SYSTOLIC BLOOD PRESSURE: 147 MMHG | WEIGHT: 219.58 LBS | TEMPERATURE: 97.9 F | BODY MASS INDEX: 41.49 KG/M2

## 2019-02-09 DIAGNOSIS — R10.9 ACUTE LEFT FLANK PAIN: Primary | ICD-10-CM

## 2019-02-09 DIAGNOSIS — M62.830 MUSCLE SPASM OF BACK: ICD-10-CM

## 2019-02-09 LAB
ALBUMIN SERPL BCP-MCNC: 3.9 G/DL (ref 3.5–5)
ALP SERPL-CCNC: 115 U/L (ref 46–116)
ALT SERPL W P-5'-P-CCNC: 42 U/L (ref 12–78)
ANION GAP SERPL CALCULATED.3IONS-SCNC: 12 MMOL/L (ref 4–13)
AST SERPL W P-5'-P-CCNC: 21 U/L (ref 5–45)
BASOPHILS # BLD AUTO: 0.01 THOUSANDS/ΜL (ref 0–0.1)
BASOPHILS NFR BLD AUTO: 0 % (ref 0–1)
BILIRUB DIRECT SERPL-MCNC: 0.09 MG/DL (ref 0–0.2)
BILIRUB SERPL-MCNC: 0.4 MG/DL (ref 0.2–1)
BILIRUB UR QL STRIP: NEGATIVE
BUN SERPL-MCNC: 12 MG/DL (ref 5–25)
CALCIUM SERPL-MCNC: 9.2 MG/DL (ref 8.3–10.1)
CHLORIDE SERPL-SCNC: 102 MMOL/L (ref 100–108)
CLARITY UR: CLEAR
CO2 SERPL-SCNC: 25 MMOL/L (ref 21–32)
COLOR UR: YELLOW
CREAT SERPL-MCNC: 0.69 MG/DL (ref 0.6–1.3)
EOSINOPHIL # BLD AUTO: 0.09 THOUSAND/ΜL (ref 0–0.61)
EOSINOPHIL NFR BLD AUTO: 1 % (ref 0–6)
ERYTHROCYTE [DISTWIDTH] IN BLOOD BY AUTOMATED COUNT: 13.7 % (ref 11.6–15.1)
EXT PREG TEST URINE: NEGATIVE
GFR SERPL CREATININE-BSD FRML MDRD: 116 ML/MIN/1.73SQ M
GLUCOSE SERPL-MCNC: 78 MG/DL (ref 65–140)
GLUCOSE UR STRIP-MCNC: NEGATIVE MG/DL
HCT VFR BLD AUTO: 42.3 % (ref 34.8–46.1)
HGB BLD-MCNC: 13.3 G/DL (ref 11.5–15.4)
HGB UR QL STRIP.AUTO: NEGATIVE
IMM GRANULOCYTES # BLD AUTO: 0.01 THOUSAND/UL (ref 0–0.2)
IMM GRANULOCYTES NFR BLD AUTO: 0 % (ref 0–2)
KETONES UR STRIP-MCNC: NEGATIVE MG/DL
LEUKOCYTE ESTERASE UR QL STRIP: NEGATIVE
LYMPHOCYTES # BLD AUTO: 2.57 THOUSANDS/ΜL (ref 0.6–4.47)
LYMPHOCYTES NFR BLD AUTO: 37 % (ref 14–44)
MCH RBC QN AUTO: 26.3 PG (ref 26.8–34.3)
MCHC RBC AUTO-ENTMCNC: 31.4 G/DL (ref 31.4–37.4)
MCV RBC AUTO: 84 FL (ref 82–98)
MONOCYTES # BLD AUTO: 0.46 THOUSAND/ΜL (ref 0.17–1.22)
MONOCYTES NFR BLD AUTO: 7 % (ref 4–12)
NEUTROPHILS # BLD AUTO: 3.87 THOUSANDS/ΜL (ref 1.85–7.62)
NEUTS SEG NFR BLD AUTO: 55 % (ref 43–75)
NITRITE UR QL STRIP: NEGATIVE
NRBC BLD AUTO-RTO: 0 /100 WBCS
PH UR STRIP.AUTO: 6 [PH] (ref 4.5–8)
PLATELET # BLD AUTO: 312 THOUSANDS/UL (ref 149–390)
PMV BLD AUTO: 10.2 FL (ref 8.9–12.7)
POTASSIUM SERPL-SCNC: 3.6 MMOL/L (ref 3.5–5.3)
PROT SERPL-MCNC: 8.2 G/DL (ref 6.4–8.2)
PROT UR STRIP-MCNC: NEGATIVE MG/DL
RBC # BLD AUTO: 5.06 MILLION/UL (ref 3.81–5.12)
SODIUM SERPL-SCNC: 139 MMOL/L (ref 136–145)
SP GR UR STRIP.AUTO: 1.02 (ref 1–1.03)
UROBILINOGEN UR QL STRIP.AUTO: 0.2 E.U./DL
WBC # BLD AUTO: 7.01 THOUSAND/UL (ref 4.31–10.16)

## 2019-02-09 PROCEDURE — 96374 THER/PROPH/DIAG INJ IV PUSH: CPT

## 2019-02-09 PROCEDURE — 99284 EMERGENCY DEPT VISIT MOD MDM: CPT

## 2019-02-09 PROCEDURE — 36415 COLL VENOUS BLD VENIPUNCTURE: CPT | Performed by: PHYSICIAN ASSISTANT

## 2019-02-09 PROCEDURE — 74177 CT ABD & PELVIS W/CONTRAST: CPT

## 2019-02-09 PROCEDURE — 81003 URINALYSIS AUTO W/O SCOPE: CPT

## 2019-02-09 PROCEDURE — 96375 TX/PRO/DX INJ NEW DRUG ADDON: CPT

## 2019-02-09 PROCEDURE — 80048 BASIC METABOLIC PNL TOTAL CA: CPT | Performed by: PHYSICIAN ASSISTANT

## 2019-02-09 PROCEDURE — 81025 URINE PREGNANCY TEST: CPT | Performed by: PHYSICIAN ASSISTANT

## 2019-02-09 PROCEDURE — 80076 HEPATIC FUNCTION PANEL: CPT | Performed by: PHYSICIAN ASSISTANT

## 2019-02-09 PROCEDURE — 85025 COMPLETE CBC W/AUTO DIFF WBC: CPT | Performed by: PHYSICIAN ASSISTANT

## 2019-02-09 RX ORDER — IBUPROFEN 600 MG/1
600 TABLET ORAL EVERY 6 HOURS PRN
Qty: 20 TABLET | Refills: 0 | Status: SHIPPED | OUTPATIENT
Start: 2019-02-09 | End: 2019-11-29 | Stop reason: ALTCHOICE

## 2019-02-09 RX ORDER — LIDOCAINE 50 MG/G
1 PATCH TOPICAL ONCE
Status: DISCONTINUED | OUTPATIENT
Start: 2019-02-09 | End: 2019-02-09 | Stop reason: HOSPADM

## 2019-02-09 RX ORDER — LIDOCAINE 50 MG/G
1 PATCH TOPICAL DAILY
Qty: 30 PATCH | Refills: 0 | Status: SHIPPED | OUTPATIENT
Start: 2019-02-09 | End: 2019-11-29 | Stop reason: ALTCHOICE

## 2019-02-09 RX ORDER — HYDROMORPHONE HCL/PF 1 MG/ML
0.5 SYRINGE (ML) INJECTION ONCE
Status: COMPLETED | OUTPATIENT
Start: 2019-02-09 | End: 2019-02-09

## 2019-02-09 RX ORDER — CYCLOBENZAPRINE HCL 10 MG
10 TABLET ORAL 2 TIMES DAILY PRN
Qty: 14 TABLET | Refills: 0 | Status: SHIPPED | OUTPATIENT
Start: 2019-02-09 | End: 2019-11-29 | Stop reason: ALTCHOICE

## 2019-02-09 RX ORDER — KETOROLAC TROMETHAMINE 30 MG/ML
30 INJECTION, SOLUTION INTRAMUSCULAR; INTRAVENOUS ONCE
Status: COMPLETED | OUTPATIENT
Start: 2019-02-09 | End: 2019-02-09

## 2019-02-09 RX ADMIN — KETOROLAC TROMETHAMINE 30 MG: 30 INJECTION, SOLUTION INTRAMUSCULAR at 16:19

## 2019-02-09 RX ADMIN — LIDOCAINE 1 PATCH: 50 PATCH TOPICAL at 16:17

## 2019-02-09 RX ADMIN — HYDROMORPHONE HYDROCHLORIDE 0.5 MG: 1 INJECTION, SOLUTION INTRAMUSCULAR; INTRAVENOUS; SUBCUTANEOUS at 14:03

## 2019-02-09 RX ADMIN — IOHEXOL 100 ML: 350 INJECTION, SOLUTION INTRAVENOUS at 15:13

## 2019-02-09 NOTE — ED PROVIDER NOTES
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 but does not recall a specific injury  Patient states at approximately 2000 yesterday, she noted back in the lower back and took 800 mg of Ibuprofen and applied heat with some relief  Patient states upon awakening this AM, she noted the pain was worse and did take an additional 800 mg of Ibuprofen prior to going to work  Patient states she attempted to go through her work day but the pain became more intense and she could not find a position of comfort  Patient endorses associated nausea  Patient states this does not feel like her normal endometriosis pain  Denies abdominal pain, vomiting, diarrhea, numbness, tingling, urinary or bowel incontinence, saddle anesthesia, urinary frequency, urinary urgency, hematuria, dysuria, vaginal discharge, vaginal bleeding, pain with inspiration, cough, congestion, sore throat, body aches, previous back surgery, fever, chills, chest pain, SOB         History provided by:  Parent  Flank Pain   Pain location:  L flank  Pain quality: sharp and shooting    Pain radiates to:  Back  Pain severity:  Severe  Onset quality:  Gradual  Duration:  2 days  Timing:  Constant  Progression:  Worsening  Context: not alcohol use, not awakening from sleep, not previous surgeries, not sick contacts and not suspicious food intake    Relieved by:  NSAIDs, not moving and heat  Associated symptoms: nausea    Associated symptoms: no anorexia, no belching, no chest pain, no chills, no constipation, no cough, no diarrhea, no dysuria, no fatigue, no fever, no flatus, no hematemesis, no hematochezia, no hematuria, no melena, no shortness of breath, no sore throat, no vaginal bleeding, no vaginal discharge and no vomiting    Risk factors: NSAID use and obesity    Risk factors: no alcohol abuse, has not had multiple surgeries and not pregnant        Prior to Admission Medications   Prescriptions Last Dose Informant Patient Reported? Taking? Prenatal Vit-Fe Fumarate-FA (PRENATAL COMPLETE PO)  Self Yes No   Sig: Take by mouth      Facility-Administered Medications: None       Past Medical History:   Diagnosis Date    Closed fracture of left clavicle     last assessed 4/18/14    Endometriosis     Pilonidal cyst        Past Surgical History:   Procedure Laterality Date    LAPAROSCOPIC ENDOMETRIOSIS FULGURATION  2013    LAPAROSCOPY      diagnostic    PILONIDAL CYST EXCISION      resection       Family History   Problem Relation Age of Onset    Breast cancer Mother     Cancer Mother         stomach    Ovarian cancer Mother     Hypertension Father      I have reviewed and agree with the history as documented  Social History     Tobacco Use    Smoking status: Never Smoker    Smokeless tobacco: Never Used   Substance Use Topics    Alcohol use: Yes     Comment: social    Drug use: No        Review of Systems   Constitutional: Negative for appetite change, chills, fatigue, fever and unexpected weight change  HENT: Negative for congestion, drooling, ear pain, rhinorrhea, sore throat, trouble swallowing and voice change  Eyes: Negative for pain, discharge, redness and visual disturbance  Respiratory: Negative for cough, shortness of breath, wheezing and stridor  Cardiovascular: Negative for chest pain, palpitations and leg swelling  Gastrointestinal: Positive for nausea  Negative for abdominal pain, anorexia, blood in stool, constipation, diarrhea, flatus, hematemesis, hematochezia, melena and vomiting  Genitourinary: Positive for flank pain   Negative for dysuria, frequency, hematuria, urgency, vaginal bleeding and vaginal discharge  Musculoskeletal: Positive for back pain  Negative for gait problem, joint swelling, neck pain and neck stiffness  Skin: Negative for color change and rash  Neurological: Negative for dizziness, seizures, light-headedness and headaches  Physical Exam  Physical Exam   Constitutional: She is oriented to person, place, and time  She appears well-developed and well-nourished  She appears distressed  HENT:   Head: Normocephalic and atraumatic  Nose: Nose normal    Mouth/Throat: Oropharynx is clear and moist    Eyes: Pupils are equal, round, and reactive to light  Conjunctivae and EOM are normal    Neck: Normal range of motion  Neck supple  Cardiovascular: Normal rate, regular rhythm and intact distal pulses  Pulmonary/Chest: Effort normal and breath sounds normal  No respiratory distress  She has no wheezes  She has no rales  Abdominal: Soft  Bowel sounds are normal  There is no tenderness  There is CVA tenderness  Left CVAT  Musculoskeletal: Normal range of motion  Lumbar back: She exhibits tenderness and spasm  She exhibits normal range of motion and no edema  Back:    Neurological: She is alert and oriented to person, place, and time  She has normal strength  No sensory deficit  GCS eye subscore is 4  GCS verbal subscore is 5  GCS motor subscore is 6  Skin: Skin is warm and dry  Capillary refill takes less than 2 seconds  Psychiatric: She has a normal mood and affect  Nursing note and vitals reviewed        Vital Signs  ED Triage Vitals   Temperature Pulse Respirations Blood Pressure SpO2   02/09/19 1150 02/09/19 1150 02/09/19 1150 02/09/19 1156 02/09/19 1150   97 9 °F (36 6 °C) 93 18 130/66 100 %      Temp Source Heart Rate Source Patient Position - Orthostatic VS BP Location FiO2 (%)   02/09/19 1150 02/09/19 1150 02/09/19 1150 02/09/19 1150 --   Oral Monitor Lying Right arm       Pain Score       02/09/19 1150       8           Vitals:    02/09/19 1150 02/09/19 1156 02/09/19 1406   BP:  130/66 147/79   Pulse: 93  69   Patient Position - Orthostatic VS: Lying Standing Sitting       Visual Acuity      ED Medications  Medications   HYDROmorphone (DILAUDID) injection 0 5 mg (0 5 mg Intravenous Given 2/9/19 1403)   iohexol (OMNIPAQUE) 350 MG/ML injection (MULTI-DOSE) 100 mL (100 mL Intravenous Given 2/9/19 1513)   ketorolac (TORADOL) injection 30 mg (30 mg Intravenous Given 2/9/19 1619)       Diagnostic Studies  Results Reviewed     Procedure Component Value Units Date/Time    Hepatic function panel [[id number]]  (Normal) Collected:  02/09/19 1314    Lab Status:  Final result Specimen:  Blood from Arm, Right Updated:  02/09/19 1544     Total Bilirubin 0 40 mg/dL      Bilirubin, Direct 0 09 mg/dL      Alkaline Phosphatase 115 U/L      AST 21 U/L      ALT 42 U/L      Total Protein 8 2 g/dL      Albumin 3 9 g/dL     Basic metabolic panel [[id number]] Collected:  02/09/19 1314    Lab Status:  Final result Specimen:  Blood from Arm, Right Updated:  02/09/19 1330     Sodium 139 mmol/L      Potassium 3 6 mmol/L      Chloride 102 mmol/L      CO2 25 mmol/L      ANION GAP 12 mmol/L      BUN 12 mg/dL      Creatinine 0 69 mg/dL      Glucose 78 mg/dL      Calcium 9 2 mg/dL      eGFR 116 ml/min/1 73sq m     Narrative:         National Kidney Disease Education Program recommendations are as follows:  GFR calculation is accurate only with a steady state creatinine  Chronic Kidney disease less than 60 ml/min/1 73 sq  meters  Kidney failure less than 15 ml/min/1 73 sq  meters      CBC and differential [[id number]]  (Abnormal) Collected:  02/09/19 1314    Lab Status:  Final result Specimen:  Blood from Arm, Right Updated:  02/09/19 1321     WBC 7 01 Thousand/uL      RBC 5 06 Million/uL      Hemoglobin 13 3 g/dL      Hematocrit 42 3 %      MCV 84 fL      MCH 26 3 pg      MCHC 31 4 g/dL      RDW 13 7 %      MPV 10 2 fL      Platelets 456 Thousands/uL      nRBC 0 /100 WBCs      Neutrophils Relative 55 %      Immat GRANS % 0 %      Lymphocytes Relative 37 %      Monocytes Relative 7 %      Eosinophils Relative 1 %      Basophils Relative 0 %      Neutrophils Absolute 3 87 Thousands/µL      Immature Grans Absolute 0 01 Thousand/uL      Lymphocytes Absolute 2 57 Thousands/µL      Monocytes Absolute 0 46 Thousand/µL      Eosinophils Absolute 0 09 Thousand/µL      Basophils Absolute 0 01 Thousands/µL     POCT pregnancy, urine [[id number]]  (Normal) Resulted:  02/09/19 1319    Lab Status:  Final result Updated:  02/09/19 1319     EXT PREG TEST UR (Ref: Negative) negative    ED Urine Macroscopic [[id number]] Collected:  02/09/19 1238    Lab Status:  Final result Specimen:  Urine Updated:  02/09/19 1236     Color, UA Yellow     Clarity, UA Clear     pH, UA 6 0     Leukocytes, UA Negative     Nitrite, UA Negative     Protein, UA Negative mg/dl      Glucose, UA Negative mg/dl      Ketones, UA Negative mg/dl      Urobilinogen, UA 0 2 E U /dl      Bilirubin, UA Negative     Blood, UA Negative     Specific Gravity, UA 1 025    Narrative:       [facility] RESULT                 CT abdomen pelvis with contrast   Final Result by Henry Mack MD (02/09 1520)      1  No acute abnormality in the abdomen or pelvis  2   Mild hepatic steatosis  Workstation performed: [id number]                    Procedures  Procedures       Phone Contacts  ED Phone Contact    ED Course  ED Course as of Feb 10 1337   Sat Feb 09, 2019   1559 Rechecked patient  Patient states she is feeling better  Educated patient regarding diagnosis and management  Advised patient to follow up with PCP  Advised patient to RTER for persistent or worsening symptoms                                     MDM  Number of Diagnoses or Management Options  Acute left flank pain: new and does not require workup  Muscle spasm of back: new and does not require workup  Diagnosis management comments: Differential diagnosis included but not limited to: muscle strain, muscle spasm, radiculopathy, nephrolithiasis, pyelonephritis, pneumonia, unlikely ruptured ectopic pregnancy, ovarian torsion, ovarian cyst, PE (Well's Score = 0, PERC = 0), epidural abscess, cauda equina    Urine pregnancy negative  UA WNL  Labs WNL  Ct abdomen and pelvis without acute disease  Pain improved with IV Dilaudid, IV Toradol and Lidocaine Patch  I provided patient with strict RTER precautions  I advised patient follow-up with PCP in 24-48 hours  Patient verbalized understanding  Amount and/or Complexity of Data Reviewed  Clinical lab tests: ordered and reviewed  Tests in the radiology section of CPT®: ordered and reviewed  Review and summarize past medical records: yes    Risk of Complications, Morbidity, and/or Mortality  Presenting problems: moderate  Diagnostic procedures: moderate  Management options: moderate    Patient Progress  Patient progress: improved      Disposition  Final diagnoses:   Acute left flank pain   Muscle spasm of back     Time reflects when diagnosis was documented in both MDM as applicable and the Disposition within this note     Time User Action Codes Description Comment    2/9/2019  4:01 PM Percy Bethanie Add [R10 9] Acute left flank pain     2/9/2019  4:01 PM Percy Bethanie Add [R64 752] Muscle spasm of back       ED Disposition     ED Disposition Condition Date/Time Comment    Discharge  Sat Feb 9, 2019  4:01 PM Bhavani Old discharge to home/self care      Condition at discharge: Good        Follow-up Information     Follow up With Specialties Details Why Contact Info Additional [address] Emergency Department Emergency Medicine Go to If symptoms worsen [facility] [address] [phone] AN ED, [facility], [address]    Karyn Smith MD Family Medicine Schedule an appointment as soon as possible for a visit  [facility] [address]  [phone]             Discharge Medication List as of 2/9/2019  4:04 PM      START taking these medications    Details   cyclobenzaprine (FLEXERIL) 10 mg tablet Take 1 tablet (10 mg total) by mouth 2 (two) times a day as needed for muscle spasms, Starting Sat 2/9/2019, Print      ibuprofen (MOTRIN) 600 mg tablet Take 1 tablet (600 mg total) by mouth every 6 (six) hours as needed for mild pain or moderate pain, Starting Sat 2/9/2019, Print      lidocaine (LIDODERM) 5 % Apply 1 patch topically daily Remove & Discard patch within 12 hours or as directed by MD, Starting Sat 2/9/2019, Print         CONTINUE these medications which have NOT CHANGED    Details   Prenatal Vit-Fe Fumarate-FA (PRENATAL COMPLETE PO) Take by mouth, Historical Med           No discharge procedures on file      ED Provider  Electronically Signed by           Reid Michaels PA-C  02/10/19 7484

## 2019-02-09 NOTE — DISCHARGE INSTRUCTIONS
Flank Pain   WHAT YOU NEED TO KNOW:   Flank pain is felt in the area below your ribcage and above your hip bones, often in the lower back  Your pain may be dull or so severe that you cannot get comfortable  The pain may stay in one area or radiate to another area  It may worsen and lighten in waves  Flank pain is often a sign of problems with your urinary tract, such as a kidney stone or infection  DISCHARGE INSTRUCTIONS:   Return to the emergency department if:   · You have a fever  · Your heart is fluttering or jumping  · You see blood in your urine  · Your pain radiates into your lower abdomen and genital area  · You have intense pain in your low back next to your spine  · You are much more tired than usual and have no desire to eat  · You have a headache and your muscles jerk  Contact your healthcare provider if:   · You have an upset stomach and are vomiting  · You have to urinate more often, and with urgency  · Your pain worsens or does not improve, and you cannot get comfortable  · You pass a stone when you urinate  · You have questions or concerns about your condition or care  Medicines: The following medicines may be ordered for you:  · Pain medicine  may help decrease or relieve your pain  Do not wait until the pain is severe before you take your medicine  · Antibiotics  may help treat a urinary tract infection caused by bacteria  · Take your medicine as directed  Contact your healthcare provider if you think your medicine is not helping or if you have side effects  Tell him of her if you are allergic to any medicine  Keep a list of the medicines, vitamins, and herbs you take  Include the amounts, and when and why you take them  Bring the list or the pill bottles to follow-up visits  Carry your medicine list with you in case of an emergency    Follow up with your healthcare provider in 1 to 2 days or as directed:  Write down your questions so you remember to ask them during your visits  © 2017 [employer] Information is for End User's use only and may not be sold, redistributed or otherwise used for commercial purposes  All illustrations and images included in CareNotes® are the copyrighted property of [employer]  or [employer]  The above information is an  only  It is not intended as medical advice for individual conditions or treatments  Talk to your doctor, nurse or pharmacist before following any medical regimen to see if it is safe and effective for you  Muscle Spasm   WHAT YOU NEED TO KNOW:   A muscle spasm is a sudden contraction of any muscle or group of muscles  A muscle cramp is a painful muscle spasm  Muscle cramps commonly occur after intense exercise or during pregnancy  They may also be caused by certain medications, dehydration, low calcium or magnesium levels, or another medical condition  DISCHARGE INSTRUCTIONS:   Medicines: You may need the following:  · NSAIDs  help decrease swelling and pain or fever  This medicine is available with or without a doctor's order  NSAIDs can cause stomach bleeding or kidney problems in certain people  If you take blood thinner medicine, always ask your healthcare provider if NSAIDs are safe for you  Always read the medicine label and follow directions  · Take your medicine as directed  Contact your healthcare provider if you think your medicine is not helping or if you have side effects  Tell him of her if you are allergic to any medicine  Keep a list of the medicines, vitamins, and herbs you take  Include the amounts, and when and why you take them  Bring the list or the pill bottles to follow-up visits  Carry your medicine list with you in case of an emergency  Follow up with your healthcare provider as directed: You may need other tests or treatment   You may also be referred to a physical therapist or other specialist  Write down your questions so you remember to ask them during your visits  Self-care:   · Stretch  your muscle to help relieve the cramp  It may be helpful to keep your muscle in the stretched position until the cramp is gone  · Apply heat  to help decrease pain and muscle spasms  Apply heat on the area for 20 to 30 minutes every 2 hours for as many days as directed  · Apply ice  to help decrease swelling and pain  Ice may also help prevent tissue damage  Use an ice pack, or put crushed ice in a plastic bag  Cover it with a towel and place it on your muscle for 15 to 20 minutes every hour or as directed  · Drink more liquids  to help prevent muscle cramps caused by dehydration  Sports drinks may help replace electrolytes you lose through sweat during exercise  Ask your healthcare provider how much liquid to drink each day and which liquids are best for you  · Eat healthy foods , such as fruits, vegetables, whole grains, low-fat dairy products, and lean proteins (meat, beans, and fish)  If you are pregnant, ask your healthcare provider about foods that are high in magnesium and sodium  They may help to relieve cramps during pregnancy  · Massage your muscle  to help relieve the cramp  · Take frequent deep breaths  until the cramp feels better  Lie down while you take the deep breaths so you do not get dizzy or lightheaded  Contact your healthcare provider if:   · You have signs of dehydration, such as a headache, dark yellow urine, dry eyes or mouth, or a fast heartbeat  · You have questions or concerns about your condition or care  Return to the emergency department if:   · You have warmth, swelling, or redness in the cramping muscle  · You have frequent or unrelieved muscle cramps in several different muscles  · You have muscle cramps with numbness, tingling, and burning in your hands and feet    © 2017 [employer] Information is for End User's use only and may not be sold, redistributed or otherwise used for commercial purposes  All illustrations and images included in CareNotes® are the copyrighted property of [employer]  or Cristian Owen  The above information is an  only  It is not intended as medical advice for individual conditions or treatments  Talk to your doctor, nurse or pharmacist before following any medical regimen to see if it is safe and effective for you

## 2019-04-04 ENCOUNTER — LAB REQUISITION (OUTPATIENT)
Dept: LAB | Facility: HOSPITAL | Age: 34
End: 2019-04-04
Payer: COMMERCIAL

## 2019-04-04 DIAGNOSIS — Z31.41 ENCOUNTER FOR FERTILITY TESTING: ICD-10-CM

## 2019-04-04 PROCEDURE — 83516 IMMUNOASSAY NONANTIBODY: CPT | Performed by: OBSTETRICS & GYNECOLOGY

## 2019-04-08 LAB — MIS SERPL-MCNC: 1.3 NG/ML

## 2019-11-29 ENCOUNTER — OFFICE VISIT (OUTPATIENT)
Dept: FAMILY MEDICINE CLINIC | Facility: CLINIC | Age: 34
End: 2019-11-29
Payer: COMMERCIAL

## 2019-11-29 VITALS
HEART RATE: 102 BPM | HEIGHT: 61 IN | OXYGEN SATURATION: 98 % | BODY MASS INDEX: 41.65 KG/M2 | TEMPERATURE: 98.7 F | SYSTOLIC BLOOD PRESSURE: 124 MMHG | WEIGHT: 220.6 LBS | DIASTOLIC BLOOD PRESSURE: 86 MMHG

## 2019-11-29 DIAGNOSIS — J01.00 ACUTE NON-RECURRENT MAXILLARY SINUSITIS: Primary | ICD-10-CM

## 2019-11-29 PROCEDURE — 99213 OFFICE O/P EST LOW 20 MIN: CPT | Performed by: NURSE PRACTITIONER

## 2019-11-29 PROCEDURE — 1036F TOBACCO NON-USER: CPT | Performed by: NURSE PRACTITIONER

## 2019-11-29 PROCEDURE — 3008F BODY MASS INDEX DOCD: CPT | Performed by: NURSE PRACTITIONER

## 2019-11-29 NOTE — LETTER
November 29, 2019     Patient: Sofie Espino   YOB: 1985   Date of Visit: 11/29/2019       To Whom it May Concern:    Sofie Espino is under my professional care  She was seen in my office on 11/29/2019  She may return to work on 12/1/19  If you have any questions or concerns, please don't hesitate to call           Sincerely,          ANIRUDH Cortez        CC: No Recipients

## 2019-11-29 NOTE — PROGRESS NOTES
Assessment/Plan:    No problem-specific Assessment & Plan notes found for this encounter  Diagnoses and all orders for this visit:    Acute non-recurrent maxillary sinusitis  Comments:  DW patient appearing to be viral in nature and supportive treatments     BMI 40 0-44 9, adult ([facility] 75 )          Subjective:      Patient ID: Natacha Richardson is a 36 y o  female  Patient here today with complaints started late last week and having fatigue and wheezing coughing and congestion and coughing and pain in the chest and lower back when coughing positive sick contacts with similar symptoms patient had her flu shot >2 weeks ago  The following portions of the patient's history were reviewed and updated as appropriate:   She  has a past medical history of Closed fracture of left clavicle, Endometriosis, and Pilonidal cyst   She   Patient Active Problem List    Diagnosis Date Noted    Acute non-recurrent maxillary sinusitis 11/29/2019    Chronic coughing 12/21/2018    PCOS (polycystic ovarian syndrome) 11/08/2018    Morbid obesity ([place] [address] ) 09/30/2018    BMI 40 0-44 9, adult ([place] [address] ) 09/30/2018    Infertility associated with anovulation 09/30/2018    Anovulatory cycle 12/06/2017    Endometriosis 07/20/2017     She  has a past surgical history that includes Laparoscopic endometriosis fulguration (2013); LAPAROSCOPY; and PILONIDAL CYST EXCISION  Her family history includes Breast cancer in her mother; Cancer in her mother; Hypertension in her father; Ovarian cancer in her mother  She  reports that she has never smoked  She has never used smokeless tobacco  She reports that she drinks alcohol  She reports that she does not use drugs  She is allergic to sulfamethoxazole-trimethoprim       Review of Systems   Constitutional: Positive for fatigue  Negative for activity change, appetite change, chills, diaphoresis, fever and unexpected weight change  HENT: Positive for congestion  Eyes: Negative      Respiratory: Positive for cough and wheezing  Cardiovascular: Negative  Gastrointestinal: Negative  Endocrine: Negative  Genitourinary: Negative  Musculoskeletal: Negative  Skin: Negative  Allergic/Immunologic: Negative  Neurological: Negative  Hematological: Negative  Psychiatric/Behavioral: Negative  Objective:      /86   Pulse 102   Temp 98 7 °F (37 1 °C) (Tympanic)   Ht 5' 1" (1 549 m)   Wt 100 kg (220 lb 9 6 oz)   LMP  (LMP Unknown)   SpO2 98%   BMI 41 68 kg/m²          Physical Exam   Constitutional: She is oriented to person, place, and time  Vital signs are normal  She appears well-developed and well-nourished  No distress  HENT:   Head: Normocephalic and atraumatic  Right Ear: Hearing normal  A middle ear effusion is present  Left Ear: Hearing normal  A middle ear effusion is present  Nose: Mucosal edema present  Right sinus exhibits maxillary sinus tenderness  Left sinus exhibits maxillary sinus tenderness  Mouth/Throat: Uvula is midline, oropharynx is clear and moist and mucous membranes are normal    Eyes: Pupils are equal, round, and reactive to light  Neck: Normal range of motion  No thyromegaly present  Cardiovascular: Normal rate, regular rhythm, S1 normal, S2 normal, normal heart sounds and intact distal pulses  No murmur heard  Pulmonary/Chest: Effort normal and breath sounds normal  No respiratory distress  She has no wheezes  Abdominal: Soft  Normal appearance and bowel sounds are normal    Musculoskeletal: Normal range of motion  Neurological: She is alert and oriented to person, place, and time  Skin: Skin is warm and dry  Psychiatric: She has a normal mood and affect  Nursing note and vitals reviewed  BMI Counseling: Body mass index is 41 68 kg/m²  The BMI is above normal  Nutrition recommendations include decreasing overall calorie intake, 3-5 servings of fruits/vegetables daily and reducing fast food intake   Exercise recommendations include exercising 3-5 times per week

## 2019-12-09 ENCOUNTER — TELEPHONE (OUTPATIENT)
Dept: FAMILY MEDICINE CLINIC | Facility: CLINIC | Age: 34
End: 2019-12-09

## 2019-12-09 DIAGNOSIS — J01.00 ACUTE NON-RECURRENT MAXILLARY SINUSITIS: Primary | ICD-10-CM

## 2019-12-09 RX ORDER — AZITHROMYCIN 250 MG/1
TABLET, FILM COATED ORAL
Qty: 6 TABLET | Refills: 0 | Status: SHIPPED | OUTPATIENT
Start: 2019-12-09 | End: 2019-12-13

## 2019-12-09 NOTE — TELEPHONE ENCOUNTER
Patient called and still not feeling any better from the 11/29  Her chest is hurting and back is hurting  She is coughing up green phlegm   She isn't sure what to do    X-ray? ? She has tried OTC and that doesn't seem to help  [facility] [place] is correct

## 2020-06-12 ENCOUNTER — HOSPITAL ENCOUNTER (EMERGENCY)
Facility: HOSPITAL | Age: 35
Discharge: HOME | End: 2020-06-13
Attending: EMERGENCY MEDICINE | Admitting: EMERGENCY MEDICINE
Payer: COMMERCIAL

## 2020-06-12 DIAGNOSIS — N93.9 VAGINAL BLEEDING: Primary | ICD-10-CM

## 2020-06-12 DIAGNOSIS — D25.9 UTERINE FIBROID: ICD-10-CM

## 2020-06-12 PROCEDURE — 99284 EMERGENCY DEPT VISIT MOD MDM: CPT

## 2020-06-13 ENCOUNTER — APPOINTMENT (EMERGENCY)
Dept: CT IMAGING | Facility: HOSPITAL | Age: 35
End: 2020-06-13
Payer: COMMERCIAL

## 2020-06-13 VITALS
OXYGEN SATURATION: 100 % | SYSTOLIC BLOOD PRESSURE: 139 MMHG | BODY MASS INDEX: 42.53 KG/M2 | HEART RATE: 78 BPM | WEIGHT: 225.09 LBS | RESPIRATION RATE: 20 BRPM | DIASTOLIC BLOOD PRESSURE: 82 MMHG | TEMPERATURE: 98.3 F

## 2020-06-13 PROBLEM — N93.9 VAGINAL BLEEDING: Status: ACTIVE | Noted: 2020-06-13

## 2020-06-13 PROBLEM — D25.9 UTERINE FIBROID: Status: ACTIVE | Noted: 2020-06-13

## 2020-06-13 LAB
ALBUMIN SERPL BCP-MCNC: 3.4 G/DL (ref 3.5–5)
ALP SERPL-CCNC: 109 U/L (ref 46–116)
ALT SERPL W P-5'-P-CCNC: 30 U/L (ref 12–78)
ANION GAP SERPL CALCULATED.3IONS-SCNC: 8 MMOL/L (ref 4–13)
AST SERPL W P-5'-P-CCNC: 16 U/L (ref 5–45)
BACTERIA UR QL AUTO: ABNORMAL /HPF
BASOPHILS # BLD AUTO: 0.02 THOUSANDS/ΜL (ref 0–0.1)
BASOPHILS NFR BLD AUTO: 0 % (ref 0–1)
BILIRUB SERPL-MCNC: 0.2 MG/DL (ref 0.2–1)
BILIRUB UR QL STRIP: NEGATIVE
BUN SERPL-MCNC: 14 MG/DL (ref 5–25)
CALCIUM SERPL-MCNC: 8.4 MG/DL (ref 8.3–10.1)
CHLORIDE SERPL-SCNC: 105 MMOL/L (ref 100–108)
CLARITY UR: CLEAR
CO2 SERPL-SCNC: 26 MMOL/L (ref 21–32)
COLOR UR: YELLOW
CREAT SERPL-MCNC: 0.93 MG/DL (ref 0.6–1.3)
EOSINOPHIL # BLD AUTO: 0.15 THOUSAND/ΜL (ref 0–0.61)
EOSINOPHIL NFR BLD AUTO: 2 % (ref 0–6)
ERYTHROCYTE [DISTWIDTH] IN BLOOD BY AUTOMATED COUNT: 13.8 % (ref 11.6–15.1)
GFR SERPL CREATININE-BSD FRML MDRD: 80 ML/MIN/1.73SQ M
GLUCOSE SERPL-MCNC: 152 MG/DL (ref 65–140)
GLUCOSE UR STRIP-MCNC: NEGATIVE MG/DL
HCG SERPL QL: NEGATIVE
HCT VFR BLD AUTO: 38 % (ref 34.8–46.1)
HGB BLD-MCNC: 12.1 G/DL (ref 11.5–15.4)
HGB UR QL STRIP.AUTO: ABNORMAL
IMM GRANULOCYTES # BLD AUTO: 0.03 THOUSAND/UL (ref 0–0.2)
IMM GRANULOCYTES NFR BLD AUTO: 0 % (ref 0–2)
INR PPP: 0.98 (ref 0.84–1.19)
KETONES UR STRIP-MCNC: NEGATIVE MG/DL
LEUKOCYTE ESTERASE UR QL STRIP: NEGATIVE
LYMPHOCYTES # BLD AUTO: 3.07 THOUSANDS/ΜL (ref 0.6–4.47)
LYMPHOCYTES NFR BLD AUTO: 40 % (ref 14–44)
MCH RBC QN AUTO: 26.3 PG (ref 26.8–34.3)
MCHC RBC AUTO-ENTMCNC: 31.8 G/DL (ref 31.4–37.4)
MCV RBC AUTO: 83 FL (ref 82–98)
MONOCYTES # BLD AUTO: 0.68 THOUSAND/ΜL (ref 0.17–1.22)
MONOCYTES NFR BLD AUTO: 9 % (ref 4–12)
NEUTROPHILS # BLD AUTO: 3.8 THOUSANDS/ΜL (ref 1.85–7.62)
NEUTS SEG NFR BLD AUTO: 49 % (ref 43–75)
NITRITE UR QL STRIP: NEGATIVE
NON-SQ EPI CELLS URNS QL MICRO: ABNORMAL /HPF
NRBC BLD AUTO-RTO: 0 /100 WBCS
PH UR STRIP.AUTO: 6 [PH]
PLATELET # BLD AUTO: 270 THOUSANDS/UL (ref 149–390)
PMV BLD AUTO: 11.1 FL (ref 8.9–12.7)
POTASSIUM SERPL-SCNC: 3.7 MMOL/L (ref 3.5–5.3)
PROT SERPL-MCNC: 7.3 G/DL (ref 6.4–8.2)
PROT UR STRIP-MCNC: NEGATIVE MG/DL
PROTHROMBIN TIME: 13 SECONDS (ref 11.6–14.5)
RBC # BLD AUTO: 4.6 MILLION/UL (ref 3.81–5.12)
RBC #/AREA URNS AUTO: ABNORMAL /HPF
SODIUM SERPL-SCNC: 139 MMOL/L (ref 136–145)
SP GR UR STRIP.AUTO: 1.01 (ref 1–1.03)
UROBILINOGEN UR QL STRIP.AUTO: 0.2 E.U./DL
WBC # BLD AUTO: 7.75 THOUSAND/UL (ref 4.31–10.16)

## 2020-06-13 PROCEDURE — 36415 COLL VENOUS BLD VENIPUNCTURE: CPT | Performed by: PHYSICIAN ASSISTANT

## 2020-06-13 PROCEDURE — 96365 THER/PROPH/DIAG IV INF INIT: CPT

## 2020-06-13 PROCEDURE — 96375 TX/PRO/DX INJ NEW DRUG ADDON: CPT

## 2020-06-13 PROCEDURE — 85025 COMPLETE CBC W/AUTO DIFF WBC: CPT | Performed by: PHYSICIAN ASSISTANT

## 2020-06-13 PROCEDURE — 84703 CHORIONIC GONADOTROPIN ASSAY: CPT | Performed by: PHYSICIAN ASSISTANT

## 2020-06-13 PROCEDURE — 99284 EMERGENCY DEPT VISIT MOD MDM: CPT | Performed by: PHYSICIAN ASSISTANT

## 2020-06-13 PROCEDURE — 74177 CT ABD & PELVIS W/CONTRAST: CPT

## 2020-06-13 PROCEDURE — 81001 URINALYSIS AUTO W/SCOPE: CPT | Performed by: PHYSICIAN ASSISTANT

## 2020-06-13 PROCEDURE — 85610 PROTHROMBIN TIME: CPT | Performed by: PHYSICIAN ASSISTANT

## 2020-06-13 PROCEDURE — 80053 COMPREHEN METABOLIC PANEL: CPT | Performed by: PHYSICIAN ASSISTANT

## 2020-06-13 PROCEDURE — 96361 HYDRATE IV INFUSION ADD-ON: CPT

## 2020-06-13 RX ORDER — TRANEXAMIC ACID 650 1/1
650 TABLET ORAL 2 TIMES DAILY
Qty: 10 TABLET | Refills: 0 | Status: SHIPPED | OUTPATIENT
Start: 2020-06-13 | End: 2020-08-25 | Stop reason: ALTCHOICE

## 2020-06-13 RX ORDER — KETOROLAC TROMETHAMINE 30 MG/ML
15 INJECTION, SOLUTION INTRAMUSCULAR; INTRAVENOUS ONCE
Status: COMPLETED | OUTPATIENT
Start: 2020-06-13 | End: 2020-06-13

## 2020-06-13 RX ORDER — ONDANSETRON 2 MG/ML
4 INJECTION INTRAMUSCULAR; INTRAVENOUS ONCE
Status: COMPLETED | OUTPATIENT
Start: 2020-06-13 | End: 2020-06-13

## 2020-06-13 RX ADMIN — TRANEXAMIC ACID 1000 MG: 1 INJECTION, SOLUTION INTRAVENOUS at 01:42

## 2020-06-13 RX ADMIN — SODIUM CHLORIDE 1000 ML: 0.9 INJECTION, SOLUTION INTRAVENOUS at 01:01

## 2020-06-13 RX ADMIN — ONDANSETRON 4 MG: 2 INJECTION INTRAMUSCULAR; INTRAVENOUS at 00:28

## 2020-06-13 RX ADMIN — IOHEXOL 100 ML: 350 INJECTION, SOLUTION INTRAVENOUS at 01:00

## 2020-06-13 RX ADMIN — KETOROLAC TROMETHAMINE 15 MG: 30 INJECTION, SOLUTION INTRAMUSCULAR at 00:28

## 2020-07-13 ENCOUNTER — TRANSCRIBE ORDERS (OUTPATIENT)
Dept: ADMINISTRATIVE | Facility: HOSPITAL | Age: 35
End: 2020-07-13

## 2020-07-13 DIAGNOSIS — D25.9 UTERINE LEIOMYOMA, UNSPECIFIED LOCATION: Primary | ICD-10-CM

## 2020-07-13 PROCEDURE — 88305 TISSUE EXAM BY PATHOLOGIST: CPT | Performed by: PATHOLOGY

## 2020-07-13 PROCEDURE — 88342 IMHCHEM/IMCYTCHM 1ST ANTB: CPT | Performed by: PATHOLOGY

## 2020-07-14 ENCOUNTER — LAB REQUISITION (OUTPATIENT)
Dept: LAB | Facility: HOSPITAL | Age: 35
End: 2020-07-14
Payer: COMMERCIAL

## 2020-07-14 DIAGNOSIS — N71.1 CHRONIC INFLAMMATORY DISEASE OF UTERUS: ICD-10-CM

## 2020-07-20 ENCOUNTER — HOSPITAL ENCOUNTER (OUTPATIENT)
Dept: MRI IMAGING | Facility: HOSPITAL | Age: 35
Discharge: HOME | End: 2020-07-20
Payer: COMMERCIAL

## 2020-07-20 DIAGNOSIS — D25.9 UTERINE LEIOMYOMA, UNSPECIFIED LOCATION: ICD-10-CM

## 2020-07-20 PROCEDURE — 72197 MRI PELVIS W/O & W/DYE: CPT

## 2020-07-20 PROCEDURE — A9585 GADOBUTROL INJECTION: HCPCS | Performed by: OBSTETRICS & GYNECOLOGY

## 2020-07-20 RX ADMIN — GADOBUTROL 10 ML: 604.72 INJECTION INTRAVENOUS at 17:22

## 2020-07-27 ENCOUNTER — TRANSCRIBE ORDERS (OUTPATIENT)
Dept: ADMINISTRATIVE | Facility: HOSPITAL | Age: 35
End: 2020-07-27

## 2020-07-27 ENCOUNTER — APPOINTMENT (OUTPATIENT)
Dept: LAB | Facility: CLINIC | Age: 35
End: 2020-07-27
Payer: COMMERCIAL

## 2020-07-27 DIAGNOSIS — Z11.3 SCREENING EXAMINATION FOR VENEREAL DISEASE: ICD-10-CM

## 2020-07-27 DIAGNOSIS — Z01.83 ENCOUNTER FOR BLOOD TYPING: ICD-10-CM

## 2020-07-27 DIAGNOSIS — Z11.8 SCREENING EXAMINATION FOR PARASITIC INFECTION: ICD-10-CM

## 2020-07-27 DIAGNOSIS — E28.2 POLYCYSTIC OVARIES: Primary | ICD-10-CM

## 2020-07-27 DIAGNOSIS — E66.8 OBESITY OF ENDOCRINE ORIGIN: ICD-10-CM

## 2020-07-27 DIAGNOSIS — Z31.41 FERTILITY TESTING: ICD-10-CM

## 2020-07-27 DIAGNOSIS — Z22.4 CARRIER OF INFECTIONS WITH PREDOMINANTLY SEXUAL MODE OF TRANSMISSION: ICD-10-CM

## 2020-07-27 DIAGNOSIS — R76.0 RAISED ANTIBODY TITER: ICD-10-CM

## 2020-07-27 DIAGNOSIS — E28.2 POLYCYSTIC OVARIES: ICD-10-CM

## 2020-07-27 DIAGNOSIS — D64.9 ANEMIA, UNSPECIFIED TYPE: ICD-10-CM

## 2020-07-27 DIAGNOSIS — Z11.59 SCREENING FOR VIRAL DISEASE: ICD-10-CM

## 2020-07-27 DIAGNOSIS — E07.9 DISEASE OF THYROID GLAND: ICD-10-CM

## 2020-07-27 LAB
ABO GROUP BLD: NORMAL
BASOPHILS # BLD AUTO: 0.02 THOUSANDS/ΜL (ref 0–0.1)
BASOPHILS NFR BLD AUTO: 0 % (ref 0–1)
BLD GP AB SCN SERPL QL: NEGATIVE
EOSINOPHIL # BLD AUTO: 0.07 THOUSAND/ΜL (ref 0–0.61)
EOSINOPHIL NFR BLD AUTO: 1 % (ref 0–6)
ERYTHROCYTE [DISTWIDTH] IN BLOOD BY AUTOMATED COUNT: 13.9 % (ref 11.6–15.1)
HCT VFR BLD AUTO: 43.5 % (ref 34.8–46.1)
HGB BLD-MCNC: 13.3 G/DL (ref 11.5–15.4)
IMM GRANULOCYTES # BLD AUTO: 0.03 THOUSAND/UL (ref 0–0.2)
IMM GRANULOCYTES NFR BLD AUTO: 0 % (ref 0–2)
LYMPHOCYTES # BLD AUTO: 2.43 THOUSANDS/ΜL (ref 0.6–4.47)
LYMPHOCYTES NFR BLD AUTO: 29 % (ref 14–44)
MCH RBC QN AUTO: 26.4 PG (ref 26.8–34.3)
MCHC RBC AUTO-ENTMCNC: 30.6 G/DL (ref 31.4–37.4)
MCV RBC AUTO: 87 FL (ref 82–98)
MONOCYTES # BLD AUTO: 0.59 THOUSAND/ΜL (ref 0.17–1.22)
MONOCYTES NFR BLD AUTO: 7 % (ref 4–12)
NEUTROPHILS # BLD AUTO: 5.39 THOUSANDS/ΜL (ref 1.85–7.62)
NEUTS SEG NFR BLD AUTO: 63 % (ref 43–75)
NRBC BLD AUTO-RTO: 0 /100 WBCS
PLATELET # BLD AUTO: 282 THOUSANDS/UL (ref 149–390)
PMV BLD AUTO: 12.2 FL (ref 8.9–12.7)
RBC # BLD AUTO: 5.03 MILLION/UL (ref 3.81–5.12)
RH BLD: POSITIVE
SPECIMEN EXPIRATION DATE: NORMAL
WBC # BLD AUTO: 8.53 THOUSAND/UL (ref 4.31–10.16)

## 2020-07-27 PROCEDURE — 86790 VIRUS ANTIBODY NOS: CPT

## 2020-07-27 PROCEDURE — 83516 IMMUNOASSAY NONANTIBODY: CPT

## 2020-07-27 PROCEDURE — 86645 CMV ANTIBODY IGM: CPT

## 2020-07-27 PROCEDURE — 80081 OBSTETRIC PANEL INC HIV TSTG: CPT

## 2020-07-27 PROCEDURE — 84402 ASSAY OF FREE TESTOSTERONE: CPT

## 2020-07-27 PROCEDURE — 83525 ASSAY OF INSULIN: CPT

## 2020-07-27 PROCEDURE — 86787 VARICELLA-ZOSTER ANTIBODY: CPT

## 2020-07-27 PROCEDURE — 84439 ASSAY OF FREE THYROXINE: CPT

## 2020-07-27 PROCEDURE — 86632 CHLAMYDIA IGM ANTIBODY: CPT

## 2020-07-27 PROCEDURE — 86631 CHLAMYDIA ANTIBODY: CPT

## 2020-07-27 PROCEDURE — 80053 COMPREHEN METABOLIC PANEL: CPT

## 2020-07-27 PROCEDURE — 84443 ASSAY THYROID STIM HORMONE: CPT

## 2020-07-27 PROCEDURE — 84146 ASSAY OF PROLACTIN: CPT

## 2020-07-27 PROCEDURE — 80061 LIPID PANEL: CPT

## 2020-07-27 PROCEDURE — 84481 FREE ASSAY (FT-3): CPT

## 2020-07-27 PROCEDURE — 84403 ASSAY OF TOTAL TESTOSTERONE: CPT

## 2020-07-27 PROCEDURE — 82607 VITAMIN B-12: CPT

## 2020-07-27 PROCEDURE — 82627 DEHYDROEPIANDROSTERONE: CPT

## 2020-07-27 PROCEDURE — 86705 HEP B CORE ANTIBODY IGM: CPT

## 2020-07-27 PROCEDURE — 36415 COLL VENOUS BLD VENIPUNCTURE: CPT

## 2020-07-27 PROCEDURE — 86704 HEP B CORE ANTIBODY TOTAL: CPT

## 2020-07-27 PROCEDURE — 83498 ASY HYDROXYPROGESTERONE 17-D: CPT

## 2020-07-27 PROCEDURE — 86644 CMV ANTIBODY: CPT

## 2020-07-27 PROCEDURE — 83036 HEMOGLOBIN GLYCOSYLATED A1C: CPT

## 2020-07-27 PROCEDURE — 86803 HEPATITIS C AB TEST: CPT

## 2020-07-28 LAB
ALBUMIN SERPL BCP-MCNC: 3.7 G/DL (ref 3.5–5)
ALP SERPL-CCNC: 97 U/L (ref 46–116)
ALT SERPL W P-5'-P-CCNC: 28 U/L (ref 12–78)
ANION GAP SERPL CALCULATED.3IONS-SCNC: 7 MMOL/L (ref 4–13)
AST SERPL W P-5'-P-CCNC: 16 U/L (ref 5–45)
BILIRUB SERPL-MCNC: 0.44 MG/DL (ref 0.2–1)
BUN SERPL-MCNC: 11 MG/DL (ref 5–25)
CALCIUM SERPL-MCNC: 9.2 MG/DL (ref 8.3–10.1)
CHLORIDE SERPL-SCNC: 104 MMOL/L (ref 100–108)
CHOLEST SERPL-MCNC: 198 MG/DL (ref 50–200)
CO2 SERPL-SCNC: 26 MMOL/L (ref 21–32)
CREAT SERPL-MCNC: 0.7 MG/DL (ref 0.6–1.3)
EST. AVERAGE GLUCOSE BLD GHB EST-MCNC: 114 MG/DL
GFR SERPL CREATININE-BSD FRML MDRD: 113 ML/MIN/1.73SQ M
GLUCOSE P FAST SERPL-MCNC: 67 MG/DL (ref 65–99)
HBA1C MFR BLD: 5.6 %
HBV CORE AB SER QL: NORMAL
HBV CORE IGM SER QL: NORMAL
HBV SURFACE AG SER QL: NORMAL
HCV AB SER QL: NORMAL
HDLC SERPL-MCNC: 66 MG/DL
INSULIN SERPL-ACNC: 14.3 MU/L (ref 3–25)
LDLC SERPL CALC-MCNC: 121 MG/DL (ref 0–100)
NONHDLC SERPL-MCNC: 132 MG/DL
POTASSIUM SERPL-SCNC: 3.9 MMOL/L (ref 3.5–5.3)
PROLACTIN SERPL-MCNC: 14.7 NG/ML
PROT SERPL-MCNC: 7.9 G/DL (ref 6.4–8.2)
RPR SER QL: NORMAL
RUBV IGG SERPL IA-ACNC: >175 IU/ML
SODIUM SERPL-SCNC: 137 MMOL/L (ref 136–145)
T3FREE SERPL-MCNC: 2.43 PG/ML (ref 2.3–4.2)
T4 FREE SERPL-MCNC: 1.06 NG/DL (ref 0.76–1.46)
TRIGL SERPL-MCNC: 56 MG/DL
TSH SERPL DL<=0.05 MIU/L-ACNC: 1.69 UIU/ML (ref 0.36–3.74)
VIT B12 SERPL-MCNC: 397 PG/ML (ref 100–900)

## 2020-07-29 LAB
CMV IGG SERPL IA-ACNC: 4.7 U/ML (ref 0–0.59)
CMV IGM SERPL IA-ACNC: <30 AU/ML (ref 0–29.9)
DHEA-S SERPL-MCNC: 426 UG/DL (ref 84.8–378)
HIV 1+2 AB+HIV1 P24 AG SERPL QL IA: NORMAL
HTLV I+II AB SER QL IA: NEGATIVE
TESTOST FREE SERPL-MCNC: 6 PG/ML (ref 0–4.2)
TESTOST SERPL-MCNC: 58 NG/DL (ref 8–48)

## 2020-07-30 LAB
C TRACH IGM TITR SER IF: <0.8 INDEX (ref 0–0.7)
CHLAMYDIA IGG SER-ACNC: <0.91 RATIO (ref 0–0.9)
VZV IGG SER IA-ACNC: NORMAL

## 2020-08-01 LAB
17OHP SERPL-MCNC: 225 NG/DL
MIS SERPL-MCNC: 1.4 NG/ML

## 2020-08-25 ENCOUNTER — OFFICE VISIT (OUTPATIENT)
Dept: FAMILY MEDICINE CLINIC | Facility: CLINIC | Age: 35
End: 2020-08-25
Payer: COMMERCIAL

## 2020-08-25 ENCOUNTER — TELEPHONE (OUTPATIENT)
Dept: FAMILY MEDICINE CLINIC | Facility: CLINIC | Age: 35
End: 2020-08-25

## 2020-08-25 VITALS
HEART RATE: 85 BPM | TEMPERATURE: 97 F | SYSTOLIC BLOOD PRESSURE: 123 MMHG | WEIGHT: 216.4 LBS | OXYGEN SATURATION: 99 % | BODY MASS INDEX: 40.89 KG/M2 | DIASTOLIC BLOOD PRESSURE: 81 MMHG

## 2020-08-25 DIAGNOSIS — N63.0 LUMP OR MASS IN BREAST: Primary | ICD-10-CM

## 2020-08-25 DIAGNOSIS — Z80.3 FAMILY HISTORY OF BREAST CANCER: ICD-10-CM

## 2020-08-25 DIAGNOSIS — R22.2 CHEST WALL MASS: ICD-10-CM

## 2020-08-25 PROCEDURE — 99213 OFFICE O/P EST LOW 20 MIN: CPT | Performed by: PHYSICIAN ASSISTANT

## 2020-08-25 PROCEDURE — 1036F TOBACCO NON-USER: CPT | Performed by: PHYSICIAN ASSISTANT

## 2020-08-25 NOTE — PROGRESS NOTES
Assessment/Plan:       Problem List Items Addressed This Visit        Other    Chest wall mass    Relevant Orders    US breast right limited (diagnostic)    Mammo screening bilateral w cad    Family history of breast cancer    Relevant Orders    Mammo screening bilateral w cad    Lump or mass in breast - Primary    Relevant Orders    US breast right limited (diagnostic)    Mammo screening bilateral w cad        Will order US to evaluate this mass, also will order mammogram given her FH of breast cancer in mother at 28  Will follow up with testing results  Subjective:      Patient ID: Garcia Li is a 30 y o  female  Pt presents with compalints of a mass/lump under the R clavicle/superior R breast  She shares she had noticed this weeks ago however she noticed it has gotten slightly bigger  She has a FH of what sounds like lipomas in her father, however her she shares her mother was diagnosed with breast cancer at age 28  Pt was BRCA1/2 negative  She denies pain over the mass  She denies any other breast changes  No other current concerns   Pt never had a mammogram       The following portions of the patient's history were reviewed and updated as appropriate:   She  has a past medical history of Closed fracture of left clavicle, Endometriosis, and Pilonidal cyst   She   Patient Active Problem List    Diagnosis Date Noted    Chest wall mass 08/25/2020    Family history of breast cancer 08/25/2020    Lump or mass in breast 08/25/2020    Vaginal bleeding 06/13/2020    Uterine fibroid 06/13/2020    Acute non-recurrent maxillary sinusitis 11/29/2019    Chronic coughing 12/21/2018    PCOS (polycystic ovarian syndrome) 11/08/2018    Morbid obesity ([address] ) 09/30/2018    BMI 40 0-44 9, adult ([facility] [address] ) 09/30/2018    Infertility associated with anovulation 09/30/2018    Anovulatory cycle 12/06/2017    Endometriosis 07/20/2017     She  has a past surgical history that includes Laparoscopic endometriosis fulguration (2013); LAPAROSCOPY; and PILONIDAL CYST EXCISION  Her family history includes Breast cancer in her mother; Cancer in her mother; Hypertension in her father; Ovarian cancer in her mother  She  reports that she has never smoked  She has never used smokeless tobacco  She reports current alcohol use  She reports that she does not use drugs  Current Outpatient Medications   Medication Sig Dispense Refill    Prenatal Vit-Fe Fumarate-FA (PRENATAL COMPLETE PO) Take by mouth       No current facility-administered medications for this visit  Current Outpatient Medications on File Prior to Visit   Medication Sig    Prenatal Vit-Fe Fumarate-FA (PRENATAL COMPLETE PO) Take by mouth    [DISCONTINUED] Tranexamic Acid 650 MG TABS Take 1 tablet by mouth 2 (two) times a day     No current facility-administered medications on file prior to visit  She is allergic to sulfamethoxazole-trimethoprim       Review of Systems   Constitutional: Negative for chills, fatigue and fever  HENT: Negative for congestion, ear pain, hearing loss, nosebleeds, postnasal drip, rhinorrhea, sinus pressure, sinus pain, sneezing and sore throat  Eyes: Negative for pain, discharge, itching and visual disturbance  Respiratory: Negative for cough, chest tightness, shortness of breath and wheezing  Cardiovascular: Negative for chest pain, palpitations and leg swelling  Gastrointestinal: Negative for abdominal pain, blood in stool, constipation, diarrhea, nausea and vomiting  Genitourinary: Negative for frequency and urgency  Musculoskeletal:        Mass under R clavicle superior R breast   Skin: Negative  Neurological: Negative for dizziness, light-headedness and numbness  Psychiatric/Behavioral: Negative  Objective:      /81 (BP Location: Right arm)   Pulse 85   Temp (!) 97 °F (36 1 °C)   Wt 98 2 kg (216 lb 6 4 oz)   SpO2 99%   BMI 40 89 kg/m²          Physical Exam  Vitals signs and nursing note reviewed  Constitutional:       General: She is not in acute distress  Appearance: Normal appearance  HENT:      Head: Normocephalic and atraumatic  Nose: Nose normal       Mouth/Throat:      Mouth: Mucous membranes are moist       Pharynx: Oropharynx is clear  No oropharyngeal exudate or posterior oropharyngeal erythema  Eyes:      Pupils: Pupils are equal, round, and reactive to light  Neck:      Musculoskeletal: Normal range of motion and neck supple  Cardiovascular:      Rate and Rhythm: Normal rate and regular rhythm  Heart sounds: Normal heart sounds  No murmur  Pulmonary:      Effort: Pulmonary effort is normal  No respiratory distress  Breath sounds: Normal breath sounds  No wheezing, rhonchi or rales  Abdominal:      General: Bowel sounds are normal       Palpations: Abdomen is soft  Musculoskeletal: Normal range of motion  Arms:    Skin:     General: Skin is warm and dry  Findings: No erythema or lesion  Neurological:      Mental Status: She is alert and oriented to person, place, and time     Psychiatric:         Mood and Affect: Mood and affect normal

## 2020-08-25 NOTE — TELEPHONE ENCOUNTER
Pt left a message stating she needs a different mammo order  She tried to set up appt with central sched  Because she has a lump/lipoma  They said mammo needs to be diagnostic w/ possible us  any questions call Jovita Gibson [phone]

## 2020-09-03 ENCOUNTER — HOSPITAL ENCOUNTER (OUTPATIENT)
Dept: ULTRASOUND IMAGING | Facility: CLINIC | Age: 35
Discharge: HOME | End: 2020-09-03
Payer: COMMERCIAL

## 2020-09-03 ENCOUNTER — HOSPITAL ENCOUNTER (OUTPATIENT)
Dept: MAMMOGRAPHY | Facility: CLINIC | Age: 35
Discharge: HOME | End: 2020-09-03
Payer: COMMERCIAL

## 2020-09-03 VITALS — HEIGHT: 61 IN | BODY MASS INDEX: 40.78 KG/M2 | WEIGHT: 216 LBS

## 2020-09-03 DIAGNOSIS — R22.2 CHEST WALL MASS: ICD-10-CM

## 2020-09-03 DIAGNOSIS — Z80.3 FAMILY HISTORY OF BREAST CANCER: ICD-10-CM

## 2020-09-03 DIAGNOSIS — N63.0 LUMP OR MASS IN BREAST: ICD-10-CM

## 2020-09-03 DIAGNOSIS — Z12.39 BREAST CANCER SCREENING, HIGH RISK PATIENT: Primary | ICD-10-CM

## 2020-09-03 PROCEDURE — 77066 DX MAMMO INCL CAD BI: CPT

## 2020-09-03 PROCEDURE — 76642 ULTRASOUND BREAST LIMITED: CPT

## 2020-09-03 PROCEDURE — G0279 TOMOSYNTHESIS, MAMMO: HCPCS

## 2020-09-04 ENCOUNTER — TELEPHONE (OUTPATIENT)
Dept: SURGICAL ONCOLOGY | Facility: CLINIC | Age: 35
End: 2020-09-04

## 2020-09-04 NOTE — TELEPHONE ENCOUNTER
New Patient Encounter    New Patient Intake Form   Patient Details:  Joel Bustillo  1985  [id number]    Background Information:  [address] starts by opening a telephone encounter and gathering the following information   Who is calling to schedule? If not self, relationship to patient? self   Referring Provider ALEXIS Ford    What is the diagnosis? High risk breast cancer    Is this diagnosis confirmed? Yes   When was the diagnosis? 9/3   Is there a confirmed diagnosis from a biopsy/tissue reviewed by pathology? na   Were outside slides requested? No   Is patient aware of diagnosis? Yes   Is there a personal history and what kind? na   Is there a family history and what kind? na   Reason for visit? New Diagnosis   Have you had any imaging or labs done? If so: when, where? yes  [facility]   Are records in EPIC? yes   If patient has a prior history of breast cancer were old records obtained? NA   Was the patient told to bring a disk? no   Does the patient smoke or Vape? no   If yes, how many packs or cartridges per day? na   Scheduling Information:   Preferred [facility]: [facility]     Are there any dates/time the patient cannot be seen? na   Miscellaneous: na   After completing the above information, please route to Financial Counselor and the appropriate Nurse Navigator for review

## 2020-11-10 ENCOUNTER — TELEPHONE (OUTPATIENT)
Dept: SURGICAL ONCOLOGY | Facility: CLINIC | Age: 35
End: 2020-11-10

## 2020-11-11 ENCOUNTER — CONSULT (OUTPATIENT)
Dept: SURGICAL ONCOLOGY | Facility: CLINIC | Age: 35
End: 2020-11-11
Payer: COMMERCIAL

## 2020-11-11 VITALS
DIASTOLIC BLOOD PRESSURE: 70 MMHG | BODY MASS INDEX: 42.48 KG/M2 | RESPIRATION RATE: 16 BRPM | HEART RATE: 96 BPM | TEMPERATURE: 98.4 F | HEIGHT: 61 IN | WEIGHT: 225 LBS | SYSTOLIC BLOOD PRESSURE: 140 MMHG

## 2020-11-11 DIAGNOSIS — Z91.89 INCREASED RISK OF BREAST CANCER: ICD-10-CM

## 2020-11-11 DIAGNOSIS — D17.1 LIPOMA OF ANTERIOR CHEST WALL: ICD-10-CM

## 2020-11-11 DIAGNOSIS — Z80.3 FAMILY HISTORY OF BREAST CANCER: Primary | ICD-10-CM

## 2020-11-11 DIAGNOSIS — Z12.39 ENCOUNTER FOR BREAST CANCER SCREENING OTHER THAN MAMMOGRAM: ICD-10-CM

## 2020-11-11 DIAGNOSIS — Z12.39 BREAST CANCER SCREENING, HIGH RISK PATIENT: ICD-10-CM

## 2020-11-11 PROCEDURE — 99244 OFF/OP CNSLTJ NEW/EST MOD 40: CPT | Performed by: NURSE PRACTITIONER

## 2020-11-11 RX ORDER — GLUCOSAMINE HCL 500 MG
600 TABLET ORAL DAILY
COMMUNITY

## 2020-11-11 RX ORDER — MELATONIN
1000 DAILY
COMMUNITY

## 2020-11-12 ENCOUNTER — TELEPHONE (OUTPATIENT)
Dept: HEMATOLOGY ONCOLOGY | Facility: CLINIC | Age: 35
End: 2020-11-12

## 2020-11-30 ENCOUNTER — TELEPHONE (OUTPATIENT)
Dept: GENETICS | Facility: CLINIC | Age: 35
End: 2020-11-30

## 2020-12-21 ENCOUNTER — IMMUNIZATIONS (OUTPATIENT)
Dept: FAMILY MEDICINE CLINIC | Facility: HOSPITAL | Age: 35
End: 2020-12-21
Payer: COMMERCIAL

## 2020-12-21 DIAGNOSIS — Z23 ENCOUNTER FOR IMMUNIZATION: ICD-10-CM

## 2020-12-21 PROCEDURE — 91300 SARS-COV-2 / COVID-19 MRNA VACCINE (PFIZER-BIONTECH) 30 MCG: CPT

## 2020-12-21 PROCEDURE — 0001A SARS-COV-2 / COVID-19 MRNA VACCINE (PFIZER-BIONTECH) 30 MCG: CPT

## 2021-01-11 ENCOUNTER — IMMUNIZATIONS (OUTPATIENT)
Dept: FAMILY MEDICINE CLINIC | Facility: HOSPITAL | Age: 36
End: 2021-01-11

## 2021-01-11 DIAGNOSIS — Z23 ENCOUNTER FOR IMMUNIZATION: ICD-10-CM

## 2021-01-11 PROCEDURE — 0002A SARS-COV-2 / COVID-19 MRNA VACCINE (PFIZER-BIONTECH) 30 MCG: CPT

## 2021-01-11 PROCEDURE — 91300 SARS-COV-2 / COVID-19 MRNA VACCINE (PFIZER-BIONTECH) 30 MCG: CPT

## 2021-01-12 ENCOUNTER — TELEPHONE (OUTPATIENT)
Dept: SURGICAL ONCOLOGY | Facility: CLINIC | Age: 36
End: 2021-01-12

## 2021-01-15 NOTE — TELEPHONE ENCOUNTER
Parisa Garsia has an opening on 2/4 at 230, please see if the patient is available at this time  I can also look into other dates if not

## 2021-02-21 ENCOUNTER — TRANSCRIBE ORDERS (OUTPATIENT)
Dept: LAB | Facility: CLINIC | Age: 36
End: 2021-02-21

## 2021-02-21 ENCOUNTER — LAB (OUTPATIENT)
Dept: LAB | Facility: CLINIC | Age: 36
End: 2021-02-21
Payer: COMMERCIAL

## 2021-02-21 DIAGNOSIS — E28.2 POLYCYSTIC OVARIES: ICD-10-CM

## 2021-02-21 DIAGNOSIS — E28.2 POLYCYSTIC OVARIES: Primary | ICD-10-CM

## 2021-02-21 LAB
ESTRADIOL SERPL-MCNC: 57 PG/ML
PROGEST SERPL-MCNC: 1 NG/ML

## 2021-02-21 PROCEDURE — 84144 ASSAY OF PROGESTERONE: CPT

## 2021-02-21 PROCEDURE — 36415 COLL VENOUS BLD VENIPUNCTURE: CPT

## 2021-02-21 PROCEDURE — 82670 ASSAY OF TOTAL ESTRADIOL: CPT

## 2021-03-01 ENCOUNTER — TELEPHONE (OUTPATIENT)
Dept: HEMATOLOGY ONCOLOGY | Facility: CLINIC | Age: 36
End: 2021-03-01

## 2021-03-01 NOTE — TELEPHONE ENCOUNTER
COVID Screening   Patient called in to go over screening questions     Response was Negative     Important Notes Patient states she may have to reschedule   Went over appointment details, patient voiced understanding

## 2021-03-03 ENCOUNTER — HOSPITAL ENCOUNTER (OUTPATIENT)
Dept: ULTRASOUND IMAGING | Facility: CLINIC | Age: 36
Discharge: HOME | End: 2021-03-03
Payer: COMMERCIAL

## 2021-03-03 ENCOUNTER — TRANSCRIBE ORDERS (OUTPATIENT)
Dept: LAB | Facility: CLINIC | Age: 36
End: 2021-03-03

## 2021-03-03 ENCOUNTER — APPOINTMENT (OUTPATIENT)
Dept: LAB | Facility: CLINIC | Age: 36
End: 2021-03-03
Payer: COMMERCIAL

## 2021-03-03 ENCOUNTER — CLINICAL SUPPORT (OUTPATIENT)
Dept: GENETICS | Facility: CLINIC | Age: 36
End: 2021-03-03

## 2021-03-03 DIAGNOSIS — Z12.39 ENCOUNTER FOR BREAST CANCER SCREENING OTHER THAN MAMMOGRAM: ICD-10-CM

## 2021-03-03 DIAGNOSIS — Z80.0 FAMILY HISTORY OF STOMACH CANCER: ICD-10-CM

## 2021-03-03 DIAGNOSIS — Z80.41 FAMILY HISTORY OF MALIGNANT NEOPLASM OF OVARY: ICD-10-CM

## 2021-03-03 DIAGNOSIS — Z80.41 FAMILY HISTORY OF OVARIAN CANCER: ICD-10-CM

## 2021-03-03 DIAGNOSIS — Z80.0 FAMILY HISTORY OF MALIGNANT NEOPLASM OF GASTROINTESTINAL TRACT: Primary | ICD-10-CM

## 2021-03-03 DIAGNOSIS — Z80.3 FAMILY HISTORY OF MALIGNANT NEOPLASM OF BREAST: ICD-10-CM

## 2021-03-03 DIAGNOSIS — Z80.3 FAMILY HISTORY OF BREAST CANCER: Primary | ICD-10-CM

## 2021-03-03 DIAGNOSIS — Z91.89 INCREASED RISK OF BREAST CANCER: ICD-10-CM

## 2021-03-03 DIAGNOSIS — Z80.0 FAMILY HISTORY OF MALIGNANT NEOPLASM OF GASTROINTESTINAL TRACT: ICD-10-CM

## 2021-03-03 PROCEDURE — 76377 3D RENDER W/INTRP POSTPROCES: CPT

## 2021-03-03 PROCEDURE — 36415 COLL VENOUS BLD VENIPUNCTURE: CPT

## 2021-03-03 PROCEDURE — 76641 ULTRASOUND BREAST COMPLETE: CPT

## 2021-03-03 NOTE — PROGRESS NOTES
Pre-Test Genetic Counseling Consult Note    Patient Name: Destiny Hale   /Age: 1985/36 y o  Referring Provider: ANIRUDH Lorenzo    Date of Service: 3/3/2021  Genetic Counselor: Pierre Diaz MS, [facility]  Interpretation Services: None  Location: In-person consult at [facility] of Visit: 61 minutes      Morteza Barrett was referred to the [address] [place] [address] and Genetic Assessment Program due to her family history of breast, ovarian, and stomach cancer    she presents today to discuss the possibility of a hereditary cancer syndrome, options for genetic testing, and implications for her and her family          Cancer History and Treatment:   Personal History: no personal history of cancer    Screening Hx:   Breast:  Clinical breast exam: regular  Mammogram and breast US on 9/3/20 nml; right breast benign lipoma  ABUS today  Breast biopsy: none    Colon:  Colonoscopy: none  none polyps reported    Gynecologic:  Pelvic/Pap exam on, regularly  Ovaries/Uterus intact    Skin:  Skin cancer screening has done employee screen     Other screening: none    Reproductive History  Age at menarche: 7  Parity:   Fertility Medication: currently undergoing IVF   Age at first live birth: na  : na  Oral Contraception: none  Menopause: premenopausal  Hormone replacement: none    Medical and Surgical History  Pertinent surgical history:   Past Surgical History:   Procedure Laterality Date    LAPAROSCOPIC ENDOMETRIOSIS FULGURATION  2013    LAPAROSCOPY      diagnostic    PILONIDAL CYST EXCISION      resection      Pertinent medical history:  Past Medical History:   Diagnosis Date    Closed fracture of left clavicle     last assessed 14    Endometriosis     Pilonidal cyst          Other History:  Height:   Ht Readings from Last 1 Encounters:   20 5' 1" (1 549 m)     Weight:   Wt Readings from Last 1 Encounters:   20 102 kg (225 lb)       Relevant Family History     Florecnia's mother was diagnosed with breast cancer at 40, ovarian cancer at 43, stomach cancer at 52  One of her maternal aunts was diagnosed with stomach cancer at 52  Her maternal grandfather had esophageal cancer in his 76s and had a history of smoking  There is no other maternal or paternal history of cancer  Please refer to the scanned pedigree in the Media Tab for a complete family history     *All history is reported as provided by the patient; records are not available for review, except where indicated  Assessment:  We discussed sporadic, familial and hereditary cancer  We also discussed the many factors that influence our risk for cancer such as age, environmental exposures, lifestyle choices and family history  We reviewed the indications suggestive of a hereditary predisposition to cancer  Genetic testing is indicated for Florencia based on the following criteria: Meets NCCN V2 2021 Testing Criteria for High-Penetrance Breast and/or Ovarian Cancer Susceptibility Genes: family history of a first degree relative diagnosed with breast cancer under 39; family history of a first degree relative diagnosed with ovarian cancer  The risks, benefits, and limitations of genetic testing were reviewed with the patient, as well as genetic discrimination laws, and possible test results (positive, negative, variants of uncertain significance) and their clinical implications  If positive for a mutation, options for managing cancer risk including increased surveillance, chemoprevention, and in some cases prophylactic surgery were discussed  Yolonda Brunner was informed that if a hereditary cancer syndrome was identified in her, first degree relatives (parents, siblings, and children) have a chance of also inheriting the condition  Genetic testing would allow for predictive genetic testing in other relatives, who may also be at risk depending on their degree of relation       Plan: Patient decided to proceed with testing and provided consent  Summary:     Sample Collection:  The patient was given a blood kit and instructed to go to a [facility] lab    Genetic Testing Preformed: [facility] Breast and Gyn Panel and Gastric Panel (33 genes):  APC, BEENA, BARD1, BMPR1A, BRCA1, BRCA2, BRIP1, CDH1, CHEK2, CTNNA1, DICER1, EPCAM, KIT, MLH1, MSH2, MSH6, NBN, NF1, PALB2, PDGFRA, PMS2, PTEN, RAD50, RAD51C, RAD51D, SDHA, SDHB, SDHC, SDHD, SMAD4, SMARCA4, STK11, TP53 (add on breast cancer preliminary evidence genes)     Results take approximately 2-3 weeks to complete once test is started  We will contact Florencia once results are available  Additional recommendations for surveillance/medical management will be made pending genetic test results

## 2021-03-03 NOTE — LETTER
March 3, 2021     [address]    Patient: Vane Kelly  YOB: 1985  Date of Visit: 3/3/2021      Dear Dr Martinez Ill: Thank you for referring Vane Kelly to me for evaluation  Below are my notes for this consultation  If you have questions, please do not hesitate to call me  I look forward to following your patient along with you  Sincerely,        Guanakito Shafer        CC: Jennifer Tinoco MD        Pre-Test Genetic Counseling Consult Note    Patient Name: Vane Kelly   /Age: 1985/36 y o  Referring Provider: ANIRUDH Méndez    Date of Service: 3/3/2021  Genetic Counselor: Guanakito Shafer MS, [facility]  Interpretation Services: None  Location: In-person consult at [facility] of Visit: 61 minutes      Jaimie London was referred to the [address] [place] [address] and Genetic Assessment Program due to her family history of breast, ovarian, and stomach cancer    she presents today to discuss the possibility of a hereditary cancer syndrome, options for genetic testing, and implications for her and her family          Cancer History and Treatment:   Personal History: no personal history of cancer    Screening Hx:   Breast:  Clinical breast exam: regular  Mammogram and breast US on 9/3/20 nml; right breast benign lipoma  ABUS today  Breast biopsy: none    Colon:  Colonoscopy: none  none polyps reported    Gynecologic:  Pelvic/Pap exam on, regularly  Ovaries/Uterus intact    Skin:  Skin cancer screening has done employee 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     Other screening: none    Reproductive History  Age at menarche: 7  Parity:   Fertility Medication: currently undergoing IVF   Age at first live birth: na  : na  Oral Contraception: none  Menopause: premenopausal  Hormone replacement: none    Medical and Surgical History  Pertinent surgical history:   Past Surgical History:   Procedure Laterality Date    LAPAROSCOPIC ENDOMETRIOSIS FULGURATION      LAPAROSCOPY      diagnostic    PILONIDAL CYST EXCISION      resection      Pertinent medical history:  Past Medical History:   Diagnosis Date    Closed fracture of left clavicle     last assessed 4/18/14    Endometriosis     Pilonidal cyst          Other History:  Height:   Ht Readings from Last 1 Encounters:   11/11/20 5' 1" (1 549 m)     Weight:   Wt Readings from Last 1 Encounters:   11/11/20 102 kg (225 lb)       Relevant Family History     Florencia's mother was diagnosed with breast cancer at 40, ovarian cancer at 43, stomach cancer at 52  One of her maternal aunts was diagnosed with stomach cancer at 52  Her maternal grandfather had esophageal cancer in his 76s and had a history of smoking  There is no other maternal or paternal history of cancer  Please refer to the scanned pedigree in the Media Tab for a complete family history     *All history is reported as provided by the patient; records are not available for review, except where indicated  Assessment:  We discussed sporadic, familial and hereditary cancer  We also discussed the many factors that influence our risk for cancer such as age, environmental exposures, lifestyle choices and family history  We reviewed the indications suggestive of a hereditary predisposition to cancer  Genetic testing is indicated for Florencia based on the following criteria: Meets NCCN V2 2021 Testing Criteria for High-Penetrance Breast and/or Ovarian Cancer Susceptibility Genes: family history of a first degree relative diagnosed with breast cancer under 39; family history of a first degree relative diagnosed with ovarian cancer  The risks, benefits, and limitations of genetic testing were reviewed with the patient, as well as genetic discrimination laws, and possible test results (positive, negative, variants of uncertain significance) and their clinical implications   If positive for a mutation, options for managing cancer risk including increased surveillance, chemoprevention, and in some cases prophylactic surgery were discussed  Melany Sumner was informed that if a hereditary cancer syndrome was identified in her, first degree relatives (parents, siblings, and children) have a chance of also inheriting the condition  Genetic testing would allow for predictive genetic testing in other relatives, who may also be at risk depending on their degree of relation  Plan: Patient decided to proceed with testing and provided consent  Summary:     Sample Collection:  The patient was given a blood kit and instructed to go to a [facility] lab    Genetic Testing Preformed: [facility] Breast and Gyn Panel and Gastric Panel (33 genes):  APC, BEENA, BARD1, BMPR1A, BRCA1, BRCA2, BRIP1, CDH1, CHEK2, CTNNA1, DICER1, EPCAM, KIT, MLH1, MSH2, MSH6, NBN, NF1, PALB2, PDGFRA, PMS2, PTEN, RAD50, RAD51C, RAD51D, SDHA, SDHB, SDHC, SDHD, SMAD4, SMARCA4, STK11, TP53 (add on breast cancer preliminary evidence genes)     Results take approximately 2-3 weeks to complete once test is started  We will contact Florencia once results are available  Additional recommendations for surveillance/medical management will be made pending genetic test results

## 2021-03-05 LAB — MISCELLANEOUS LAB TEST RESULT: NORMAL

## 2021-03-07 ENCOUNTER — APPOINTMENT (EMERGENCY)
Dept: ULTRASOUND IMAGING | Facility: HOSPITAL | Age: 36
End: 2021-03-07
Payer: COMMERCIAL

## 2021-03-07 ENCOUNTER — HOSPITAL ENCOUNTER (EMERGENCY)
Facility: HOSPITAL | Age: 36
Discharge: HOME | End: 2021-03-07
Attending: EMERGENCY MEDICINE
Payer: COMMERCIAL

## 2021-03-07 VITALS
RESPIRATION RATE: 17 BRPM | OXYGEN SATURATION: 97 % | HEART RATE: 69 BPM | SYSTOLIC BLOOD PRESSURE: 131 MMHG | DIASTOLIC BLOOD PRESSURE: 62 MMHG | TEMPERATURE: 97.9 F

## 2021-03-07 DIAGNOSIS — G89.18 POSTOPERATIVE PAIN: ICD-10-CM

## 2021-03-07 DIAGNOSIS — R11.0 NAUSEA: ICD-10-CM

## 2021-03-07 DIAGNOSIS — R10.2 PELVIC PAIN: Primary | ICD-10-CM

## 2021-03-07 LAB
ALBUMIN SERPL BCP-MCNC: 3 G/DL (ref 3.5–5)
ALP SERPL-CCNC: 81 U/L (ref 46–116)
ALT SERPL W P-5'-P-CCNC: 47 U/L (ref 12–78)
ANION GAP SERPL CALCULATED.3IONS-SCNC: 11 MMOL/L (ref 4–13)
AST SERPL W P-5'-P-CCNC: 46 U/L (ref 5–45)
BACTERIA UR QL AUTO: NORMAL /HPF
BASOPHILS # BLD AUTO: 0.02 THOUSANDS/ΜL (ref 0–0.1)
BASOPHILS NFR BLD AUTO: 0 % (ref 0–1)
BILIRUB DIRECT SERPL-MCNC: 0.07 MG/DL (ref 0–0.2)
BILIRUB SERPL-MCNC: 0.38 MG/DL (ref 0.2–1)
BILIRUB UR QL STRIP: NEGATIVE
BUN SERPL-MCNC: 11 MG/DL (ref 5–25)
CALCIUM SERPL-MCNC: 9 MG/DL (ref 8.3–10.1)
CHLORIDE SERPL-SCNC: 104 MMOL/L (ref 100–108)
CLARITY UR: CLEAR
CO2 SERPL-SCNC: 23 MMOL/L (ref 21–32)
COLOR UR: ABNORMAL
CREAT SERPL-MCNC: 0.64 MG/DL (ref 0.6–1.3)
EOSINOPHIL # BLD AUTO: 0.18 THOUSAND/ΜL (ref 0–0.61)
EOSINOPHIL NFR BLD AUTO: 2 % (ref 0–6)
ERYTHROCYTE [DISTWIDTH] IN BLOOD BY AUTOMATED COUNT: 15 % (ref 11.6–15.1)
EXT PREG TEST URINE: NEGATIVE
EXT. CONTROL ED NAV: NORMAL
GFR SERPL CREATININE-BSD FRML MDRD: 116 ML/MIN/1.73SQ M
GLUCOSE SERPL-MCNC: 131 MG/DL (ref 65–140)
GLUCOSE UR STRIP-MCNC: NEGATIVE MG/DL
HCT VFR BLD AUTO: 40 % (ref 34.8–46.1)
HGB BLD-MCNC: 12.2 G/DL (ref 11.5–15.4)
HGB UR QL STRIP.AUTO: ABNORMAL
IMM GRANULOCYTES # BLD AUTO: 0.04 THOUSAND/UL (ref 0–0.2)
IMM GRANULOCYTES NFR BLD AUTO: 0 % (ref 0–2)
KETONES UR STRIP-MCNC: NEGATIVE MG/DL
LACTATE SERPL-SCNC: 1.3 MMOL/L (ref 0.5–2)
LEUKOCYTE ESTERASE UR QL STRIP: NEGATIVE
LIPASE SERPL-CCNC: 119 U/L (ref 73–393)
LYMPHOCYTES # BLD AUTO: 2.69 THOUSANDS/ΜL (ref 0.6–4.47)
LYMPHOCYTES NFR BLD AUTO: 29 % (ref 14–44)
MCH RBC QN AUTO: 25.3 PG (ref 26.8–34.3)
MCHC RBC AUTO-ENTMCNC: 30.5 G/DL (ref 31.4–37.4)
MCV RBC AUTO: 83 FL (ref 82–98)
MONOCYTES # BLD AUTO: 0.82 THOUSAND/ΜL (ref 0.17–1.22)
MONOCYTES NFR BLD AUTO: 9 % (ref 4–12)
NEUTROPHILS # BLD AUTO: 5.51 THOUSANDS/ΜL (ref 1.85–7.62)
NEUTS SEG NFR BLD AUTO: 60 % (ref 43–75)
NITRITE UR QL STRIP: NEGATIVE
NON-SQ EPI CELLS URNS QL MICRO: NORMAL /HPF
NRBC BLD AUTO-RTO: 0 /100 WBCS
PH UR STRIP.AUTO: 6.5 [PH]
PLATELET # BLD AUTO: 266 THOUSANDS/UL (ref 149–390)
PMV BLD AUTO: 11.3 FL (ref 8.9–12.7)
POTASSIUM SERPL-SCNC: 5.1 MMOL/L (ref 3.5–5.3)
PROT SERPL-MCNC: 7.2 G/DL (ref 6.4–8.2)
PROT UR STRIP-MCNC: NEGATIVE MG/DL
RBC # BLD AUTO: 4.82 MILLION/UL (ref 3.81–5.12)
RBC #/AREA URNS AUTO: NORMAL /HPF
SODIUM SERPL-SCNC: 138 MMOL/L (ref 136–145)
SP GR UR STRIP.AUTO: <=1.005 (ref 1–1.03)
UROBILINOGEN UR QL STRIP.AUTO: 0.2 E.U./DL
WBC # BLD AUTO: 9.26 THOUSAND/UL (ref 4.31–10.16)
WBC #/AREA URNS AUTO: NORMAL /HPF

## 2021-03-07 PROCEDURE — 76830 TRANSVAGINAL US NON-OB: CPT

## 2021-03-07 PROCEDURE — 81001 URINALYSIS AUTO W/SCOPE: CPT | Performed by: EMERGENCY MEDICINE

## 2021-03-07 PROCEDURE — 85025 COMPLETE CBC W/AUTO DIFF WBC: CPT | Performed by: EMERGENCY MEDICINE

## 2021-03-07 PROCEDURE — 80048 BASIC METABOLIC PNL TOTAL CA: CPT | Performed by: EMERGENCY MEDICINE

## 2021-03-07 PROCEDURE — 80076 HEPATIC FUNCTION PANEL: CPT | Performed by: EMERGENCY MEDICINE

## 2021-03-07 PROCEDURE — 83605 ASSAY OF LACTIC ACID: CPT | Performed by: EMERGENCY MEDICINE

## 2021-03-07 PROCEDURE — 99285 EMERGENCY DEPT VISIT HI MDM: CPT | Performed by: EMERGENCY MEDICINE

## 2021-03-07 PROCEDURE — 76856 US EXAM PELVIC COMPLETE: CPT

## 2021-03-07 PROCEDURE — 96361 HYDRATE IV INFUSION ADD-ON: CPT

## 2021-03-07 PROCEDURE — 36415 COLL VENOUS BLD VENIPUNCTURE: CPT | Performed by: EMERGENCY MEDICINE

## 2021-03-07 PROCEDURE — 81025 URINE PREGNANCY TEST: CPT | Performed by: EMERGENCY MEDICINE

## 2021-03-07 PROCEDURE — 99284 EMERGENCY DEPT VISIT MOD MDM: CPT

## 2021-03-07 PROCEDURE — 83690 ASSAY OF LIPASE: CPT | Performed by: EMERGENCY MEDICINE

## 2021-03-07 PROCEDURE — 96375 TX/PRO/DX INJ NEW DRUG ADDON: CPT

## 2021-03-07 PROCEDURE — 96374 THER/PROPH/DIAG INJ IV PUSH: CPT

## 2021-03-07 RX ORDER — HYDROCODONE BITARTRATE AND ACETAMINOPHEN 5; 325 MG/1; MG/1
1-2 TABLET ORAL EVERY 6 HOURS PRN
Qty: 7 TABLET | Refills: 0 | Status: SHIPPED | OUTPATIENT
Start: 2021-03-07

## 2021-03-07 RX ORDER — KETOROLAC TROMETHAMINE 30 MG/ML
15 INJECTION, SOLUTION INTRAMUSCULAR; INTRAVENOUS ONCE
Status: COMPLETED | OUTPATIENT
Start: 2021-03-07 | End: 2021-03-07

## 2021-03-07 RX ORDER — ONDANSETRON 2 MG/ML
4 INJECTION INTRAMUSCULAR; INTRAVENOUS ONCE
Status: COMPLETED | OUTPATIENT
Start: 2021-03-07 | End: 2021-03-07

## 2021-03-07 RX ORDER — MORPHINE SULFATE 4 MG/ML
4 INJECTION, SOLUTION INTRAMUSCULAR; INTRAVENOUS ONCE
Status: COMPLETED | OUTPATIENT
Start: 2021-03-07 | End: 2021-03-07

## 2021-03-07 RX ORDER — NAPROXEN 500 MG/1
500 TABLET ORAL 2 TIMES DAILY WITH MEALS
Qty: 20 TABLET | Refills: 0 | Status: SHIPPED | OUTPATIENT
Start: 2021-03-07

## 2021-03-07 RX ORDER — HYDROMORPHONE HCL/PF 1 MG/ML
1 SYRINGE (ML) INJECTION ONCE
Status: COMPLETED | OUTPATIENT
Start: 2021-03-07 | End: 2021-03-07

## 2021-03-07 RX ORDER — ONDANSETRON 4 MG/1
4 TABLET, ORALLY DISINTEGRATING ORAL EVERY 6 HOURS PRN
Qty: 20 TABLET | Refills: 0 | Status: SHIPPED | OUTPATIENT
Start: 2021-03-07

## 2021-03-07 RX ADMIN — SODIUM CHLORIDE 1000 ML: 0.9 INJECTION, SOLUTION INTRAVENOUS at 16:44

## 2021-03-07 RX ADMIN — MORPHINE SULFATE 4 MG: 4 INJECTION INTRAVENOUS at 16:44

## 2021-03-07 RX ADMIN — ONDANSETRON 4 MG: 2 INJECTION INTRAMUSCULAR; INTRAVENOUS at 16:43

## 2021-03-07 RX ADMIN — HYDROMORPHONE HYDROCHLORIDE 1 MG: 1 INJECTION, SOLUTION INTRAMUSCULAR; INTRAVENOUS; SUBCUTANEOUS at 18:04

## 2021-03-07 RX ADMIN — KETOROLAC TROMETHAMINE 15 MG: 30 INJECTION, SOLUTION INTRAMUSCULAR at 16:46

## 2021-03-07 NOTE — ED PROVIDER NOTES
History  Chief Complaint   Patient presents with    Post-op Problem     egg retrival on thursday, and now is having abdominal pain & nausea      Maris Puga is a very pleasant 73-year-old female,  presents for evaluation of pelvic pain after transvaginal procedure  Patient reports that on Thursday, 2021 she underwent an egg retrieval through Dr Destiney Bergeron service  She reports that while the procedure was successful, it was somewhat complicated and took longer than expected  Provider apparently reported that they had difficulty accessing her left ovary  She initially felt somewhat sore but okay, she went to work yesterday and thinks that she over did it    Since that time she has had worsening pain generalized throughout the abdomen but worst in the pelvis and has also been having dysuria and urgency  History provided by:  Patient   used: No    Abdominal Pain  Pain location:  Suprapubic  Pain quality: cramping    Pain radiates to:  Does not radiate  Pain severity:  Mild  Onset quality:  Gradual  Duration:  2 days  Timing:  Constant  Progression:  Worsening  Chronicity:  New  Relieved by:  Nothing  Worsened by:  Nothing  Ineffective treatments:  None tried  Associated symptoms: dysuria and nausea    Associated symptoms: no chest pain, no chills, no cough, no fever, no hematuria, no shortness of breath, no sore throat and no vomiting        Prior to Admission Medications   Prescriptions Last Dose Informant Patient Reported? Taking?    Coenzyme Q10 100 MG TABS  Self Yes No   Sig: Take 600 mg by mouth daily   Prenatal Vit-Fe Fumarate-FA (PRENATAL COMPLETE PO)  Self Yes No   Sig: Take by mouth   cholecalciferol (VITAMIN D3) 1,000 units tablet   Yes No   Sig: Take 1,000 Units by mouth daily      Facility-Administered Medications: None       Past Medical History:   Diagnosis Date    Closed fracture of left clavicle     last assessed 14    Endometriosis     Pilonidal cyst Past Surgical History:   Procedure Laterality Date    LAPAROSCOPIC ENDOMETRIOSIS FULGURATION  2013    LAPAROSCOPY      diagnostic    PILONIDAL CYST EXCISION      resection       Family History   Problem Relation Age of Onset    Breast cancer Mother 40    Ovarian cancer Mother 43    Stomach cancer Mother 52    Hypertension Father     No Known Problems Maternal Aunt     Stomach cancer Maternal Aunt 54    No Known Problems Maternal Aunt     No Known Problems Paternal Aunt     No Known Problems Paternal Aunt     Esophageal cancer Maternal Grandfather 79    No Known Problems Maternal Grandmother     No Known Problems Paternal Grandmother     No Known Problems Paternal Grandfather      I have reviewed and agree with the history as documented  E-Cigarette/Vaping     E-Cigarette/Vaping Substances     Social History     Tobacco Use    Smoking status: Never Smoker    Smokeless tobacco: Never Used   Substance Use Topics    Alcohol use: Yes     Comment: social    Drug use: No       Review of Systems   Constitutional: Negative for chills and fever  HENT: Negative for ear pain and sore throat  Eyes: Negative for pain and visual disturbance  Respiratory: Negative for cough and shortness of breath  Cardiovascular: Negative for chest pain and palpitations  Gastrointestinal: Positive for abdominal pain and nausea  Negative for vomiting  Genitourinary: Positive for dysuria, frequency, urgency and vaginal pain  Negative for hematuria  Musculoskeletal: Negative for arthralgias and back pain  Skin: Negative for color change and rash  Neurological: Negative for seizures and syncope  All other systems reviewed and are negative  Physical Exam  Physical Exam  Vitals signs and nursing note reviewed  Constitutional:       General: She is not in acute distress  Appearance: She is well-developed  She is obese  HENT:      Head: Normocephalic and atraumatic     Eyes: Conjunctiva/sclera: Conjunctivae normal    Neck:      Musculoskeletal: Neck supple  Cardiovascular:      Rate and Rhythm: Normal rate and regular rhythm  Heart sounds: No murmur  Pulmonary:      Effort: Pulmonary effort is normal  No respiratory distress  Breath sounds: Normal breath sounds  Abdominal:      Palpations: Abdomen is soft  Tenderness: There is abdominal tenderness  There is no guarding or rebound  Skin:     General: Skin is warm and dry  Capillary Refill: Capillary refill takes less than 2 seconds  Neurological:      General: No focal deficit present  Mental Status: She is alert and oriented to person, place, and time           Vital Signs  ED Triage Vitals   Temperature Pulse Respirations Blood Pressure SpO2   03/07/21 1539 03/07/21 1539 03/07/21 1539 03/07/21 1539 03/07/21 1539   97 9 °F (36 6 °C) (!) 107 16 163/99 100 %      Temp Source Heart Rate Source Patient Position - Orthostatic VS BP Location FiO2 (%)   03/07/21 1539 03/07/21 1539 03/07/21 1539 03/07/21 1539 --   Oral Monitor Sitting Left arm       Pain Score       03/07/21 1644       9           Vitals:    03/07/21 1539 03/07/21 1830   BP: 163/99 131/62   Pulse: (!) 107 69   Patient Position - Orthostatic VS: Sitting Lying         Visual Acuity      ED Medications  Medications   sodium chloride 0 9 % bolus 1,000 mL (0 mL Intravenous Stopped 3/7/21 1843)   ondansetron (ZOFRAN) injection 4 mg (4 mg Intravenous Given 3/7/21 1643)   ketorolac (TORADOL) injection 15 mg (15 mg Intravenous Given 3/7/21 1646)   morphine (PF) 4 mg/mL injection 4 mg (4 mg Intravenous Given 3/7/21 1644)   HYDROmorphone (DILAUDID) injection 1 mg (1 mg Intravenous Given 3/7/21 1804)       Diagnostic Studies  Results Reviewed     Procedure Component Value Units Date/Time    Urine Microscopic [[id number]]  (Normal) Collected: 03/07/21 1755    Lab Status: Final result Specimen: Urine, Clean Catch Updated: 03/07/21 1814     RBC, UA 0-1 /hpf WBC, UA 0-1 /hpf      Epithelial Cells Occasional /hpf      Bacteria, UA None Seen /hpf     UA w Reflex to Microscopic w Reflex to Culture [[id number]]  (Abnormal) Collected: 03/07/21 1755    Lab Status: Final result Specimen: Urine, Clean Catch Updated: 03/07/21 1808     Color, UA Light Yellow     Clarity, UA Clear     Specific Gravity, UA <=1 005     pH, UA 6 5     Leukocytes, UA Negative     Nitrite, UA Negative     Protein, UA Negative mg/dl      Glucose, UA Negative mg/dl      Ketones, UA Negative mg/dl      Urobilinogen, UA 0 2 E U /dl      Bilirubin, UA Negative     Blood, UA Small    POCT pregnancy, urine [[id number]]  (Normal) Resulted: 03/07/21 1804    Lab Status: Final result Updated: 03/07/21 1805     EXT PREG TEST UR (Ref: Negative) Negative     Control Valid    Basic metabolic panel [[id number]] Collected: 03/07/21 1642    Lab Status: Final result Specimen: Blood from Arm, Right Updated: 03/07/21 1728     Sodium 138 mmol/L      Potassium 5 1 mmol/L      Chloride 104 mmol/L      CO2 23 mmol/L      ANION GAP 11 mmol/L      BUN 11 mg/dL      Creatinine 0 64 mg/dL      Glucose 131 mg/dL      Calcium 9 0 mg/dL      eGFR 116 ml/min/1 73sq m     Narrative:      [place] guidelines for Chronic Kidney Disease (CKD):     Stage 1 with normal or high GFR (GFR > 90 mL/min/1 73 square meters)    Stage 2 Mild CKD (GFR = 60-89 mL/min/1 73 square meters)    Stage 3A Moderate CKD (GFR = 45-59 mL/min/1 73 square meters)    Stage 3B Moderate CKD (GFR = 30-44 mL/min/1 73 square meters)    Stage 4 Severe CKD (GFR = 15-29 mL/min/1 73 square meters)    Stage 5 End Stage CKD (GFR <15 mL/min/1 73 square meters)  Note: GFR calculation is accurate only with a steady state creatinine    Hepatic function panel [[id number]]  (Abnormal) Collected: 03/07/21 1642    Lab Status: Final result Specimen: Blood from Arm, Right Updated: 03/07/21 1728     Total Bilirubin 0 38 mg/dL      Bilirubin, Direct 0 07 mg/dL      Alkaline Phosphatase 81 U/L      AST 46 U/L      ALT 47 U/L      Total Protein 7 2 g/dL      Albumin 3 0 g/dL     Lipase [[id number]]  (Normal) Collected: 03/07/21 1642    Lab Status: Final result Specimen: Blood from Arm, Right Updated: 03/07/21 1728     Lipase 119 u/L     Lactic acid [[id number]]  (Normal) Collected: 03/07/21 1642    Lab Status: Final result Specimen: Blood from Arm, Right Updated: 03/07/21 1711     LACTIC ACID 1 3 mmol/L     Narrative:      Result may be elevated if tourniquet was used during collection  CBC and differential [[id number]]  (Abnormal) Collected: 03/07/21 1642    Lab Status: Final result Specimen: Blood from Arm, Right Updated: 03/07/21 1654     WBC 9 26 Thousand/uL      RBC 4 82 Million/uL      Hemoglobin 12 2 g/dL      Hematocrit 40 0 %      MCV 83 fL      MCH 25 3 pg      MCHC 30 5 g/dL      RDW 15 0 %      MPV 11 3 fL      Platelets 825 Thousands/uL      nRBC 0 /100 WBCs      Neutrophils Relative 60 %      Immat GRANS % 0 %      Lymphocytes Relative 29 %      Monocytes Relative 9 %      Eosinophils Relative 2 %      Basophils Relative 0 %      Neutrophils Absolute 5 51 Thousands/µL      Immature Grans Absolute 0 04 Thousand/uL      Lymphocytes Absolute 2 69 Thousands/µL      Monocytes Absolute 0 82 Thousand/µL      Eosinophils Absolute 0 18 Thousand/µL      Basophils Absolute 0 02 Thousands/µL                  US pelvis complete w transvaginal   Final Result by Paul Guillory MD (03/07 1840)       Multiple left ovarian cysts, the largest of which measures 4 cm and is thinly septated  O - RADS category: 2 -almost certainly benign  Follow-up pelvic ultrasound is recommended in 8-12 weeks  2 7 x 3 5 x 3 3 cm left uterine fundal fibroid  3 mm possible endometrial polyp  This can be confirmed with saline sonohysterogram       The study was marked in EPIC for immediate notification                        Workstation performed: [id number] Procedures  Procedures         ED Course  ED Course as of Mar 07 1958   Sonam Casas Mar 07, 2021   1631 Page on-call physician for Dr Kamaljit Enriquez  [phone] Discussed with on-call physician, she prefers pelvic US for imaging  1912 Discussed lab and imaging results with on-call physician  Patient was re-evaluated by me and is now pain free  Her nausea has resolved with symptomatic treatment  We will plan to discharge with outpatient follow-up as already scheduled  We did discuss return precautions at length  SBIRT 22yo+      Most Recent Value   SBIRT (24 yo +)   In order to provide better care to our patients, we are screening all of our patients for alcohol and drug use  Would it be okay to ask you these screening questions? Yes Filed at: 03/07/2021 1610   Initial Alcohol Screen: US AUDIT-C    1  How often do you have a drink containing alcohol?  0 Filed at: 03/07/2021 1610   2  How many drinks containing alcohol do you have on a typical day you are drinking? 0 Filed at: 03/07/2021 1610   3a  Male UNDER 65: How often do you have five or more drinks on one occasion? 0 Filed at: 03/07/2021 1610   3b  FEMALE Any Age, or MALE 65+: How often do you have 4 or more drinks on one occassion? 0 Filed at: 03/07/2021 1610   Audit-C Score  0 Filed at: 03/07/2021 1610   DIANNE: How many times in the past year have you    Used an illegal drug or used a prescription medication for non-medical reasons? Never Filed at: 03/07/2021 1610                    MDM  Number of Diagnoses or Management Options  Nausea: new and requires workup  Pelvic pain: new and requires workup  Postoperative pain: new and requires workup  Diagnosis management comments: 71-year-old female presented for evaluation of abdominal and pelvic pain with nausea three days after a transvaginal procedure for fertility treatments  I discussed the patient's presentation with her physician on call    Concern was primarily for bleeding secondary to the procedure  Pelvic ultrasound demonstrated no free fluid in the pelvis  There were multiple ovarian cysts as would be expected given patient's history  Other possible postoperative complications such is iatrogenic bowel injury are considered highly unlikely as patient has normal laboratory studies now three days postop and her pain was easily treated with a single dose of pain medication in the emergency department  On re-evaluation patient had near complete resolution of symptoms and a benign exam   White blood cell count lactic acid were normal   Hemoglobin normal and electrolytes also within normal limits  Urinalysis was without clear sign of UTI  Will plan for symptomatic treatment and follow-up as outpatient as already scheduled  Discussed return precautions at length  Amount and/or Complexity of Data Reviewed  Clinical lab tests: reviewed and ordered  Tests in the radiology section of CPT®: reviewed and ordered  Review and summarize past medical records: yes  Discuss the patient with other providers: yes  Independent visualization of images, tracings, or specimens: yes        Disposition  Final diagnoses:   Pelvic pain   Postoperative pain   Nausea     Time reflects when diagnosis was documented in both MDM as applicable and the Disposition within this note     Time User Action Codes Description Comment    3/7/2021  7:14 PM Viona Aldridge Add [R10 2] Pelvic pain     3/7/2021  7:14 PM Viona Aldridge Add [G89 18] Postoperative pain     3/7/2021  7:14 PM Viona Aldridge Add [R11 0] Nausea       ED Disposition     ED Disposition Condition Date/Time Comment    Discharge Stable Sun Mar 7, 2021  7:14 PM Florencia Olivares discharge to home/self care              Follow-up Information     Follow up With Specialties Details Why Joel Lamas MD Obstetrics and Gynecology, Obstetrics, Gynecology   [address] [facility]  [address]  [facility] [place]  [id number] [facility] Discharge Medication List as of 3/7/2021  7:18 PM      START taking these medications    Details   HYDROcodone-acetaminophen (NORCO) 5-325 mg per tablet Take 1-2 tablets by mouth every 6 (six) hours as needed for pain for up to 10 dosesMax Daily Amount: 8 tablets, Starting Sun 3/7/2021, Print      naproxen (NAPROSYN) 500 mg tablet Take 1 tablet (500 mg total) by mouth 2 (two) times a day with meals, Starting Sun 3/7/2021, Print         CONTINUE these medications which have NOT CHANGED    Details   cholecalciferol (VITAMIN D3) 1,000 units tablet Take 1,000 Units by mouth daily, Historical Med      Coenzyme Q10 100 MG TABS Take 600 mg by mouth daily, Historical Med      Prenatal Vit-Fe Fumarate-FA (PRENATAL COMPLETE PO) Take by mouth, Historical Med           No discharge procedures on file      PDMP Review       Value Time User    PDMP Reviewed  Yes 3/7/2021  7:16 PM Robbie Sim MD          ED Provider  Electronically Signed by           Robbie Sim MD  03/07/21 7954

## 2021-03-07 NOTE — Clinical Note
Chayito Sultana was seen and treated in our emergency department on 3/7/2021  Diagnosis:     Artemio Coker  may return to work on return date  She may return on this date: 03/09/2021         If you have any questions or concerns, please don't hesitate to call        Joselyn Pace MD    ______________________________           _______________          _______________  Hospital Representative                              Date                                Time

## 2021-03-18 ENCOUNTER — TELEPHONE (OUTPATIENT)
Dept: GENETICS | Facility: CLINIC | Age: 36
End: 2021-03-18

## 2021-03-18 NOTE — TELEPHONE ENCOUNTER
Post-Test Genetic Counseling Consult Note  Today I spoke with Julio Celena over the phone to review the results of her genetic test for hereditary cancer  We met previously on 3/3 for pre-test counseling  SUMMARY:    Test(s): [facility] Breast and Gyn Panel and Gastric Panel (33 genes):  APC, BEENA, BARD1, BMPR1A, BRCA1, BRCA2, BRIP1, CDH1, CHEK2, CTNNA1, DICER1, EPCAM, KIT, MLH1, MSH2, MSH6, NBN, NF1, PALB2, PDGFRA, PMS2, PTEN, RAD50, RAD51C, RAD51D, SDHA, SDHB, SDHC, SDHD, SMAD4, SMARCA4, STK11, TP53 (add on breast cancer preliminary evidence genes)     Result: Negative - No Clinically Significant Variants Detected      Assessment:   A negative result significantly reduces the likelihood that Florencia has a hereditary cancer syndrome  However, this testing is unable to completely rule out the presence of hereditary cancer  It remains possible that:  - There is a variant in an area of a gene which was not tested or there is a variant not detectable due to technical limitations of this test      - There is a variant in another gene that was not included in this test or in a gene not known to be linked to cancer or tumors  - A family member has a genetic variant that the patient did not inherit  - The cancer in the family is sporadic and is related to non-hereditary factors  Florencia's risk of breast cancer is increased based on the family history  Despite a negative test result, we are able to run risk models to better estimate Florencia's lifetime risk of developing breast cancer   I ran three risk models based on the information Florencia provided during our pre-test counseling session:    Sheilaer-Claricezick model: Estimated lifetime risk, to age 80, for breast cancer is 24 3% compared to approximately 13 2% general population risk     Felicia Fuentes model:Estimated lifetime risk, to age 80, for breast cancer is 20 8% compared to approximately 12 6% general population risk     Jake tables: Estimated lifetime risk, to age 78, for breast cancer is 16 5%    The Tyrer-Cuzick model and Bridgette model predict that Florencia's lifetime risk for breast cancer is at least 20% therefore she qualifies for increased breast cancer screening, which is outlined below in the plan section  Florencia's risk of ovarian cancer is increased, based on the reported family history  As compared with the general population risk of approximately 1 5%, empiric data suggest that Virginias risk to develop ovarian cancer is approximately 4% given one first-degree relative with ovarian cancer  It is important to note that this may be an overestimate of risk, as the BRCA1/2 status of the families used to generate this risk figure is unknown, [PMID: R6605615      Florencia's risk of gastric cancer is increased, based on the reported family history  As compared with the general population risk of approximately 0 8%, empiric data suggest that Virginias risk to develop gastric cancer is increased by approximately 2-4 times or 1 6-3 2%, given one first-degree relative with gastric cancer (PMID: [id number])  We also discussed some non-genetic modifiable risk factors for cancer  We encouraged Florencia to practice good eating habits, maintain a healthy weight, exercise regularly, limit alcohol intake, and avoid tobacco products  Risks for Family Members:    Marshall Silva was made aware that all of her first-degree relatives are at increased risk to develop breast, ovarian, and stomach cancer based on the family history  We recommend that her first-degree relatives make their healthcare providers aware of the family history and discuss their options for screening and risk-reduction  Testing for other Family Members: We discussed that given her negative results, any future children would not require genetic testing for Florencia's family history of cancer  Any future children would still need to consider their father's side of the family when estimating cancer risk   Florencia's brothers can still consider genetic testing for the reported family history as she only share about 50% of ehr DNA with them  Relatives who wish to pursue genetic testing can reach out to the [phone]) to schedule an appointment or visit www [email or web address] to identify a local genetic counselor  Additional Information:  A healthy lifestyle will improve overall health and reduce risk for illness  Eating a healthy diet and exercising for 4 hours per week is recommended  Both diet and exercise have been shown to help maintain a healthy weight  Postmenopausal women who are overweight are at higher risk for breast cancer  Moderate to heavy alcohol use can increase the risk for some cancers  Smoking cigarettes can also increase risk for breast, lung, prostate, pancreatic and other cancers  Plan:   Recommendations for Florencia are outlined below however the surveillance and medical management should continue as clinically indicated and as determined appropriate by her healthcare providers  Breast Cancer Screening:  Florencia has met with Leia Hughes in regards to high-risk breast cancer screening  She should continue to follow with high-risk breast cancer screening because of her family history and elevated risk model score   Recommended screening per NCCN guidelines are as follows:     Breast cancer screening per the NCCN Breast Cancer Screening and Diagnosis Guideline v2 2021    Screening for women who have a lifetime risk of >20% as defined by models that are largely dependent on family history:   Clinical encounter every 6-12 months    Annual screening mammogram and consideration of tomosynthesis, to begin 10 years prior to the youngest family member but not less than age 27 years  Bri Felder Recommend annual breast MRI to begin 10 years prior to the youngest family member but not less than age 22 years   Consider whole breast ultrasound or contrast-enhanced mammography for those who qualify for but cannot undergo MRI   Breast awareness   Consider risk reduction strategies  Options for risk reduction may include risk-reducing medications, risk-reducing surgery, participation in clinical research and healthy lifestyle choices  In general, risk-reducing surgery should only be considered in women with a gene mutation strongly predisposing them to breast cancer or those at high risk due to a compelling family history or prior thoracic radiotherapy  In addition, there are age-dependent risks and contraindications to taking risk-reducing medications  The options for risk reduction should be discussed with a breast specialist or other healthcare provider able to explain the benefits and risks of these options  Colon Cancer Screening:   Population-based screening guidelines are appropriate  Gynecologic Cancer Screening/Risk Reduction:  Routine gynecologic screening  There are no standard screening recommendations for endometrial or ovarian cancer  Currently there are no guidelines recommending a prophylactic removal of the ovaries for a woman who has one first-degree/second-degree relative diagnosed with ovarian/peritoneal cancer  Skin Cancer Screening:  Continue skin cancer screening as recommended by your healthcare provider  Negative Result: Libby Calles was strongly encouraged to contact us regarding any changes in her personal or family history of cancer as these changes could alter our recommendation regarding genetic testing and/or cancer screening

## 2021-05-19 ENCOUNTER — OFFICE VISIT (OUTPATIENT)
Dept: SURGICAL ONCOLOGY | Facility: CLINIC | Age: 36
End: 2021-05-19
Payer: COMMERCIAL

## 2021-05-19 VITALS
DIASTOLIC BLOOD PRESSURE: 80 MMHG | TEMPERATURE: 97.1 F | HEIGHT: 61 IN | WEIGHT: 218 LBS | BODY MASS INDEX: 41.16 KG/M2 | OXYGEN SATURATION: 99 % | HEART RATE: 80 BPM | SYSTOLIC BLOOD PRESSURE: 130 MMHG | RESPIRATION RATE: 16 BRPM

## 2021-05-19 DIAGNOSIS — Z91.89 INCREASED RISK OF BREAST CANCER: ICD-10-CM

## 2021-05-19 DIAGNOSIS — Z80.3 FAMILY HISTORY OF BREAST CANCER: Primary | ICD-10-CM

## 2021-05-19 DIAGNOSIS — Z12.31 VISIT FOR SCREENING MAMMOGRAM: ICD-10-CM

## 2021-05-19 PROCEDURE — 99213 OFFICE O/P EST LOW 20 MIN: CPT | Performed by: NURSE PRACTITIONER

## 2021-05-19 NOTE — PROGRESS NOTES
Surgical Oncology Follow Up       [address] [place]  [facility]  [address] [id number]    [address]  1985  [id number]      Chief Complaint   Patient presents with    Follow-up     Pt is here for 6 month f/u        Assessment/Plan:  1  Family history of breast cancer  - genetic testing negative    2  Increased risk of breast cancer  - 6 mo f/u visit  - Mammo screening bilateral w 3d & cad; Future    3  Visit for screening mammogram  - Mammo screening bilateral w 3d & cad; Future    Discussion/Summary:  Patient is a 61-year-old female that presents today for a follow-up visit for an increased risk of breast cancer and family history of breast cancer  Her mother was diagnosed with breast cancer at the age of 40 ovarian cancer at age 43 and gastric cancer at age 52  Her mother did not have genetic testing  Patient recently underwent genetic testing which was negative  She had a bilateral 3D diagnostic mammogram and right breast ultrasound on September 3, 2020 which was BI-RADS 2  There was a benign right chest wall lipoma  This is stable on clinical exam  Discussed she could consider surgical excision as this does cause her discomfort at times  There were no worrisome breast findings on her exam today  I will order a bilateral mammogram for September  Patient will contact me if she becomes pregnant and a new screening plan will be developed at that time  She is not currently following with her gynecologist as she is following with her infertility specialist at this time so I will see her back in 6 months for another clinical exam   She will continue monthly self exams and contact me with any changes  All of her questions were answered today  History of Present Illness:   Test(s):  [facility] Breast and Gyn Panel and Gastric Panel (33 genes):  APC, BEENA, BARD1, BMPR1A, BRCA1, BRCA2, BRIP1, CDH1, CHEK2, CTNNA1, DICER1, EPCAM, KIT, MLH1, MSH2, MSH6, NBN, NF1, PALB2, PDGFRA, PMS2, PTEN, RAD50, RAD51C, RAD51D, SDHA, SDHB, SDHC, SDHD, SMAD4, SMARCA4, STK11, TP53 (add on breast cancer preliminary evidence genes)      Result: Negative - No Clinically Significant Variants Detected       -Interval History:  Patient underwent genetic testing which was negative  She notices no changes on her self breast exam   She states her lipoma gets tender at times  Denies breast lumps, skin changes, nipple changes or discharge  Review of Systems:  Review of Systems   Constitutional: Negative for chills, fatigue and fever  Respiratory: Negative for cough and shortness of breath  Cardiovascular: Negative for chest pain  Hematological: Negative for adenopathy  Psychiatric/Behavioral: Negative for confusion         Patient Active Problem List   Diagnosis    Morbid obesity ([facility] [address] )    BMI 40 0-44 9, adult ([facility] [address] )    Infertility associated with anovulation    Anovulatory cycle    Endometriosis    PCOS (polycystic ovarian syndrome)    Chronic coughing    Acute non-recurrent maxillary sinusitis    Vaginal bleeding    Uterine fibroid    Chest wall mass    Family history of breast cancer    Lump or mass in breast    Lipoma of anterior chest wall    Increased risk of breast cancer     Past Medical History:   Diagnosis Date    Closed fracture of left clavicle     last assessed 4/18/14    Endometriosis     Pilonidal cyst      Past Surgical History:   Procedure Laterality Date    LAPAROSCOPIC ENDOMETRIOSIS FULGURATION  2013    LAPAROSCOPY      diagnostic    PILONIDAL CYST EXCISION      resection     Family History   Problem Relation Age of Onset    Breast cancer Mother 40    Ovarian cancer Mother 43    Stomach cancer Mother 52    Hypertension Father     No Known Problems Maternal Aunt     Stomach cancer Maternal Aunt 54    No Known Problems Maternal Aunt     No Known Problems Paternal Aunt     No Known Problems Paternal Aunt     Esophageal cancer Maternal Grandfather 79    No Known Problems Maternal Grandmother     No Known Problems Paternal Grandmother     No Known Problems Paternal Grandfather      Social History     Socioeconomic History    Marital status: /Civil Union     Spouse name: Not on file    Number of children: Not on file    Years of education: Not on file    Highest education level: Not on file   Occupational History    Not on file   Social Needs    Financial resource strain: Not on file    Food insecurity     Worry: Not on file     Inability: Not on file   [employer] needs     Medical: Not on file     Non-medical: Not on file   Tobacco Use    Smoking status: Never Smoker    Smokeless tobacco: Never Used   Substance and Sexual Activity    Alcohol use: Yes     Comment: social    Drug use: No    Sexual activity: Yes   Lifestyle    Physical activity     Days per week: Not on file     Minutes per session: Not on file    Stress: Not on file   Relationships    Social connections     Talks on phone: Not on file     Gets together: Not on file     Attends Christian service: Not on file     Active member of club or organization: Not on file     Attends meetings of clubs or organizations: Not on file     Relationship status: Not on file    Intimate partner violence     Fear of current or ex partner: Not on file     Emotionally abused: Not on file     Physically abused: Not on file     Forced sexual activity: Not on file   Other Topics Concern    Not on file   Social History Narrative    Does not exercise       Current Outpatient Medications:     cholecalciferol (VITAMIN D3) 1,000 units tablet, Take 1,000 Units by mouth daily, Disp: , Rfl:     Coenzyme Q10 100 MG TABS, Take 600 mg by mouth daily, Disp: , Rfl:     HYDROcodone-acetaminophen (NORCO) 5-325 mg per tablet, Take 1-2 tablets by mouth every 6 (six) hours as needed for pain for up to 10 dosesMax Daily Amount: 8 tablets, Disp: 7 tablet, Rfl: 0    naproxen (NAPROSYN) 500 mg tablet, Take 1 tablet (500 mg total) by mouth 2 (two) times a day with meals, Disp: 20 tablet, Rfl: 0    ondansetron (ZOFRAN-ODT) 4 mg disintegrating tablet, Take 1 tablet (4 mg total) by mouth every 6 (six) hours as needed for nausea or vomiting, Disp: 20 tablet, Rfl: 0    Prenatal Vit-Fe Fumarate-FA (PRENATAL COMPLETE PO), Take by mouth, Disp: , Rfl:   Allergies   Allergen Reactions    Sulfamethoxazole-Trimethoprim Hives     Category: Allergy;      Vitals:    05/19/21 1511   BP: 130/80   Pulse: 80   Resp: 16   Temp: (!) 97 1 °F (36 2 °C)   SpO2: 99%       Physical Exam  Constitutional:       Appearance: Normal appearance  HENT:      Head: Normocephalic and atraumatic  Pulmonary:      Effort: Pulmonary effort is normal    Chest:      Breasts:         Right: No swelling, bleeding, inverted nipple, mass, nipple discharge, skin change or tenderness  Left: No bleeding, inverted nipple, mass, nipple discharge, skin change or tenderness  Lymphadenopathy:      Upper Body:      Right upper body: No supraclavicular or axillary adenopathy  Left upper body: No supraclavicular or axillary adenopathy  Neurological:      General: No focal deficit present  Mental Status: She is alert and oriented to person, place, and time  Psychiatric:         Mood and Affect: Mood normal            Advance Care Planning/Advance Directives:  Discussed disease status and treatment goals with the patient

## 2021-11-01 ENCOUNTER — TELEPHONE (OUTPATIENT)
Dept: HEMATOLOGY ONCOLOGY | Facility: CLINIC | Age: 36
End: 2021-11-01

## 2021-11-19 ENCOUNTER — IMMUNIZATIONS (OUTPATIENT)
Dept: FAMILY MEDICINE CLINIC | Facility: HOSPITAL | Age: 36
End: 2021-11-19

## 2021-11-19 DIAGNOSIS — Z23 ENCOUNTER FOR IMMUNIZATION: Primary | ICD-10-CM

## 2021-11-19 PROCEDURE — 91300 COVID-19 PFIZER VACC 0.3 ML: CPT

## 2021-11-19 PROCEDURE — 0001A COVID-19 PFIZER VACC 0.3 ML: CPT
